# Patient Record
Sex: FEMALE | Race: WHITE | NOT HISPANIC OR LATINO | Employment: FULL TIME | URBAN - METROPOLITAN AREA
[De-identification: names, ages, dates, MRNs, and addresses within clinical notes are randomized per-mention and may not be internally consistent; named-entity substitution may affect disease eponyms.]

---

## 2017-11-16 ENCOUNTER — ALLSCRIPTS OFFICE VISIT (OUTPATIENT)
Dept: OTHER | Facility: OTHER | Age: 52
End: 2017-11-16

## 2017-11-16 DIAGNOSIS — Z12.31 ENCOUNTER FOR SCREENING MAMMOGRAM FOR MALIGNANT NEOPLASM OF BREAST: ICD-10-CM

## 2017-11-18 NOTE — PROGRESS NOTES
Assessment    1  Visit for routine gyn exam (V72 31) (Z01 419)   2  Visit for screening mammogram (V76 12) (Z12 31)    Plan  Visit for screening mammogram    · MAMMO SCREENING BILATERAL W 3D & CAD; Status:Active; Requested for:16Nov2017;   Perform:[facility] Radiology; AOD:[id number]; Last Updated By:Alejandra Holland; 11/16/2017 4:06:03 PM;Ordered;for screening mammogram; Ordered By:Alejandra Ramos;    Discussion/Summary  cervical cancer screening is current Breast cancer screening: the risks and benefits of breast cancer screening were discussed, self breast exam technique was taught, monthly self breast exam was advised, mammogram is current and mammogram has been ordered  Colorectal cancer screening: the risks and benefits of colorectal cancer screening were discussed  Advice and education were given regarding aerobic exercise, weight bearing exercise and weight loss  Intermediate breast cancer risk - 3D mammogram ordered, she had scattered fibroglandular dense last year  - keep menstrual calendaron her weight loss and also urged her to continue cutting back on her smoking  The patient has the current Goals: See discussion  The patent has the current Barriers: None  Patient is able to Self-Care  Chief Complaint    1  Visit For: Preventive General Multisystem Exam    History of Present Illness  HPI: Pt here for yearly, she is doing well, she has been skipping cycles, had 7 cycles in the past year  Using condoms for contraceptionhas lost about 30# and trying to quit smoking  GYN HM, Adult Female [facility]: The patient is being seen for a health maintenance evaluation  The last health maintenance visit was 1 year(s) ago  General Health: The patient's health since the last visit is described as good  Lifestyle:  She has weight concerns  -- She exercises regularly  -- She does not use tobacco   Reproductive health: the patient is premenopausal--   she reports no menstrual problems  -- she uses contraception   For contraception, she uses condoms  -- she is sexually active  -- she denies prior pregnancies  Screening:      Review of Systems   Constitutional: No fever, no chills, feels well, no tiredness, no recent weight gain or loss  ENT: no ear ache, no loss of hearing, no nosebleeds or nasal discharge, no sore throat or hoarseness  Cardiovascular: no complaints of slow or fast heart rate, no chest pain, no palpitations, no leg claudication or lower extremity edema  Respiratory: no complaints of shortness of breath, no wheezing, no dyspnea on exertion, no orthopnea or PND  Breasts: no complaints of breast pain, breast lump or nipple discharge  Gastrointestinal: no complaints of abdominal pain, no constipation, no nausea or diarrhea, no vomiting, no bloody stools  Genitourinary: no complaints of dysuria, no incontinence, no pelvic pain, no dysmenorrhea, no vaginal discharge or abnormal vaginal bleeding  Musculoskeletal: no complaints of arthralgia, no myalgia, no joint swelling or stiffness, no limb pain or swelling  Integumentary: no complaints of skin rash or lesion, no itching or dry skin, no skin wounds  Neurological: no complaints of headache, no confusion, no numbness or tingling, no dizziness or fainting  Active Problems  1  Degenerative arthritis of left knee (715 96) (M17 12)   2  Esophageal reflux (530 81) (K21 9)   3  Fibromyalgia (729 1) (M79 7)   4  History of self breast exam   5  Hyperlipidemia (272 4) (E78 5)   6  Hypothyroidism (244 9) (E03 9)   7  Menstrual migraine without status migrainosus, not intractable (346 40) (G43 829)   8  Osteoarthritis of knee (715 36) (M17 10)   9  Visit for routine gyn exam (V72 31) (Z01 419)   10   Visit for screening mammogram (V76 12) (Z12 31)    Past Medical History   · History of Blood pressure elevated (401 9) (I10)   · History of Contraceptives (V25 02)   · History of Gall bladder polyp (575 6) (K82 4)   · History of  0 (V49 89)   · History of acute pharyngitis (V12 69) (Z87 09)   · History of fatigue (V13 89) (Z20 475)   · History of hemorrhoids (V13 89) (Z87 19)   · History of self breast exam   · History of vitamin D deficiency (V12 1) (Z86 39)   · History of Impaired fasting glucose (790 21) (R73 01)   · History of Knee injury (959 7) (S89 90XA)   · History of Left knee pain (719 46) (M25 562)   · History of Locking of left knee (717 9) (M23 92)   · History of Migraine headache (346 90) (G43 909)   · History of Oligomenorrhea (626 1) (N91 5)   · History of Pap smear for cervical cancer screening (V76 2) (Z12 4)   · History of Vitamin B12 deficiency (266 2) (E53 8)    The active problems and past medical history were reviewed and updated today  Surgical History     · History of Exploratory Laparotomy   · History of Eye Surgery   · History of Gallbladder Surgery   · History of Oral Surgery Tooth Extraction    The surgical history was reviewed and updated today  Family History  Mother    · Family history of B-cell lymphoma   · Family history of systemic lupus erythematosus (V19 4) (Z82 69)   · Family history of type 1 diabetes mellitus (V18 0) (Z83 3)  Father    · Family history of B-cell lymphoma   · Family history of type 1 diabetes mellitus (V18 0) (Z83 3)  Paternal Grandmother    · Family history of type 1 diabetes mellitus (V18 0) (Z83 3)    The family history was reviewed and updated today  Social History     · Denied: History of Alcohol Use (History)   · Caffeine Use   · Current some day smoker (305 1) (F17 200)   · Denied: History of Drug use   · Denied: History of    · Marital History - Single   · Judaism Affiliation [facility]   · Uses Safety Equipment - Seatbelts  The social history was reviewed and updated today  Current Meds   1  Topiramate 25 MG Oral Tablet; Take 3 tablets by mouth  daily;  Therapy: 66YGV6940 to (Evaluate:64Sot4873)  Requested for: 29RSF8433; Last Rx:2017; Status: ACTIVE - Retrospective By Protocol Authorization Ordered    Allergies  1  Darvon CAPS   2  Darvon-N TABS    Vitals   Recorded: 01LBJ8842 22:42YB   Systolic 425, LUE, Sitting   Diastolic 72, LUE, Sitting   Height 5 ft 7 in   Weight 212 lb 2 oz   BMI Calculated 33 22   BSA Calculated 2 07   LMP [id number]       Physical Exam   Constitutional  General appearance: No acute distress, well appearing and well nourished  Neck  Thyroid: Normal, no thyromegaly  Pulmonary  Auscultation of lungs: Clear to auscultation  Cardiovascular  Auscultation of heart: Normal rate and rhythm, normal S1 and S2, no murmurs  Genitourinary  External genitalia: Normal and no lesions appreciated  Vagina: Normal, no lesions or dryness appreciated  Urethra: Normal    Urethral meatus: Normal    Bladder: Normal, soft, non-tender and no prolapse or masses appreciated  Cervix: Normal, no palpable masses  Uterus: Normal, non-tender, not enlarged, and no palpable masses  Adnexa/parametria: Normal, non-tender and no fullness or masses appreciated  -- exam limited by body habitus  Anus, perineum, and rectum: Normal sphincter tone, no masses, and no prolapse  Chest  Breasts: Normal and no dimpling or skin changes noted  Abdomen  Abdomen: Normal, non-tender, and no organomegaly noted  Liver and spleen: No hepatomegaly or splenomegaly  Examination for hernias: No hernias appreciated  Psychiatric  Orientation to person, place, and time: Normal    Mood and affect: Normal        Signatures   Electronically signed by :  Hoang Caruso DO; Nov 17 2017  6:45AM EST                       (Author)

## 2018-01-11 ENCOUNTER — HOSPITAL ENCOUNTER (OUTPATIENT)
Dept: MAMMOGRAPHY | Facility: MEDICAL CENTER | Age: 53
Discharge: HOME | End: 2018-01-11
Payer: COMMERCIAL

## 2018-01-11 DIAGNOSIS — Z12.31 ENCOUNTER FOR SCREENING MAMMOGRAM FOR MALIGNANT NEOPLASM OF BREAST: ICD-10-CM

## 2018-01-11 PROCEDURE — 77067 SCR MAMMO BI INCL CAD: CPT

## 2018-01-11 PROCEDURE — 77063 BREAST TOMOSYNTHESIS BI: CPT

## 2018-01-13 VITALS
WEIGHT: 212.13 LBS | BODY MASS INDEX: 33.29 KG/M2 | HEIGHT: 67 IN | SYSTOLIC BLOOD PRESSURE: 122 MMHG | DIASTOLIC BLOOD PRESSURE: 72 MMHG

## 2018-01-26 DIAGNOSIS — G43.709 CHRONIC MIGRAINE WITHOUT AURA WITHOUT STATUS MIGRAINOSUS, NOT INTRACTABLE: Primary | ICD-10-CM

## 2018-01-26 RX ORDER — TOPIRAMATE 25 MG/1
TABLET ORAL
Qty: 270 TABLET | Refills: 0 | Status: SHIPPED | OUTPATIENT
Start: 2018-01-26 | End: 2018-03-14 | Stop reason: SDUPTHER

## 2018-03-14 DIAGNOSIS — G43.709 CHRONIC MIGRAINE WITHOUT AURA WITHOUT STATUS MIGRAINOSUS, NOT INTRACTABLE: ICD-10-CM

## 2018-03-14 RX ORDER — TOPIRAMATE 25 MG/1
75 TABLET ORAL DAILY
Qty: 270 TABLET | Refills: 3 | Status: SHIPPED | OUTPATIENT
Start: 2018-03-14 | End: 2019-02-21 | Stop reason: SDUPTHER

## 2018-08-01 ENCOUNTER — OFFICE VISIT (OUTPATIENT)
Dept: FAMILY MEDICINE CLINIC | Facility: CLINIC | Age: 53
End: 2018-08-01
Payer: COMMERCIAL

## 2018-08-01 VITALS
BODY MASS INDEX: 31.89 KG/M2 | DIASTOLIC BLOOD PRESSURE: 78 MMHG | HEIGHT: 67 IN | SYSTOLIC BLOOD PRESSURE: 122 MMHG | WEIGHT: 203.2 LBS

## 2018-08-01 DIAGNOSIS — M79.10 MYALGIA: ICD-10-CM

## 2018-08-01 DIAGNOSIS — M25.561 CHRONIC PAIN OF BOTH KNEES: ICD-10-CM

## 2018-08-01 DIAGNOSIS — E53.8 VITAMIN B 12 DEFICIENCY: ICD-10-CM

## 2018-08-01 DIAGNOSIS — G89.29 CHRONIC PAIN OF BOTH KNEES: ICD-10-CM

## 2018-08-01 DIAGNOSIS — E78.01 FAMILIAL HYPERCHOLESTEROLEMIA: ICD-10-CM

## 2018-08-01 DIAGNOSIS — N95.1 PERIMENOPAUSAL: ICD-10-CM

## 2018-08-01 DIAGNOSIS — M25.562 CHRONIC PAIN OF BOTH KNEES: ICD-10-CM

## 2018-08-01 DIAGNOSIS — E55.9 VITAMIN D DEFICIENCY: ICD-10-CM

## 2018-08-01 DIAGNOSIS — E03.9 ACQUIRED HYPOTHYROIDISM: ICD-10-CM

## 2018-08-01 DIAGNOSIS — G43.829 MENSTRUAL MIGRAINE WITHOUT STATUS MIGRAINOSUS, NOT INTRACTABLE: ICD-10-CM

## 2018-08-01 DIAGNOSIS — Z00.00 ENCOUNTER FOR ROUTINE ADULT HEALTH EXAMINATION WITHOUT ABNORMAL FINDINGS: Primary | ICD-10-CM

## 2018-08-01 PROCEDURE — 99396 PREV VISIT EST AGE 40-64: CPT | Performed by: FAMILY MEDICINE

## 2018-08-01 RX ORDER — RIZATRIPTAN BENZOATE 10 MG/1
10 TABLET ORAL ONCE AS NEEDED
COMMUNITY

## 2018-08-01 NOTE — PROGRESS NOTES
Assessment/Plan:     Diagnoses and all orders for this visit:    Encounter for routine adult health examination without abnormal findings    Menstrual migraine without status migrainosus, not intractable    Chronic pain of both knees  -     diclofenac sodium (VOLTAREN) 1 %; Apply 2 g topically 4 (four) times a day    Vitamin B 12 deficiency  -     Vitamin B12; Future    Perimenopausal  -     FSH and LH; Future    Familial hypercholesterolemia  -     Comprehensive metabolic panel; Future  -     Lipid Panel with Direct LDL reflex; Future    Myalgia  -     FEI Screen w/ Reflex to Titer/Pattern; Future  -     Cyclic citrul peptide antibody, IgG; Future  -     RF Screen w/ Reflex to Titer; Future    Acquired hypothyroidism  -     TSH, 3rd generation; Future  -     T4, free; Future  -     T3, free; Future    Vitamin D deficiency  -     Vitamin D 25 hydroxy; Future    Other orders  -     rizatriptan (MAXALT) 10 MG tablet; Take 10 mg by mouth once as needed for migraine May repeat in 2 hours if needed         patient is here for "well "physical and updating labs  Concerns as listed and addressed as listed  Patient likely is having an increase in her headaches due to the perimenopause  She is only taking 50 mg of the Topamax and we will increase this to 75mg  Voltaren gel ordered for knee symptoms  Advised her to consider updating with Ortho  She will follow up her blood work is completed  Subjective:   Chief Complaint   Patient presents with    Physical Exam     Yearly physical exam   Taking Topamax for migraines and Maxalt for breakthrough aura but has been getting auras about once a week and is concerned  Patient ID: Eugenio Ferris is a 50 y o  female  Patient is a pleasant 72-year-old female who was not been here for quite some time  She generally has been doing well  Her migraines were under control until fairly recently  She has been under the process of watching her diet and has lost 20+ lb  Her last menstrual was in March  It seems she has been skipping about every 3 months  Another issue is her chronic knee pain  She was told that she is bone on bone  She hopes that by losing some weight that will continue to help those symptoms  She has had the viscous injections  She has not been officially tested with regards to rheumatism lately  She is interested in updating all her data base as she is now the big "5 0"  She is compliant with mammogram and sees gyn  She currently is in a relationship  Relatively long and stressful days at work     She was steadily concerned as when she was leaving through her chart she found things that actually were somebody else's information  She did alert our staff and hopefully they will for this to the  IT department        The following portions of the patient's history were reviewed and updated as appropriate: allergies, current medications, past family history, past medical history, past social history, past surgical history and problem list       Review of Systems   Constitutional: Positive for fatigue  HENT: Positive for rhinorrhea  Respiratory: Negative for cough and shortness of breath  Gastrointestinal: Negative for constipation and diarrhea  Genitourinary:        She is wondering about perimenopause   Musculoskeletal: Positive for arthralgias ( myalgia, knee)  Neurological: Positive for headaches  characteristics of migraine or the same but they have been more frequent   Hematological: Negative  Psychiatric/Behavioral: Negative  Objective:  /78   Ht 5' 6 5" (1 689 m)   Wt 92 2 kg (203 lb 3 2 oz)   BMI 32 31 kg/m²        Physical Exam   Constitutional: She is oriented to person, place, and time  She appears well-developed and well-nourished     Pleasant 62-year-old female who appears her stated age with a BMI of 32% which does show a representative 20+ lb weight loss from last visit   HENT:   Head: Normocephalic and atraumatic  Eyes: Pupils are equal, round, and reactive to light  Chronic strabismus right   Neck: Normal range of motion  Neck supple  No thyromegaly present  Cardiovascular: Normal rate and regular rhythm  Pulmonary/Chest: Effort normal and breath sounds normal    Abdominal: Soft  Bowel sounds are normal  She exhibits no mass  There is no tenderness  Musculoskeletal:   Bilateral knee crepitus with medial and lateral tenderness   Neurological: She is oriented to person, place, and time  She displays normal reflexes  She exhibits normal muscle tone  Skin: No rash noted  Psychiatric: She has a normal mood and affect   Her behavior is normal  Judgment and thought content normal

## 2018-08-06 LAB
25(OH)D3 SERPL-MCNC: 36 NG/ML (ref 30–100)
ALBUMIN SERPL-MCNC: 4.3 G/DL (ref 3.6–5.1)
ALBUMIN/GLOB SERPL: 2 (CALC) (ref 1–2.5)
ALP SERPL-CCNC: 116 U/L (ref 33–130)
ALT SERPL-CCNC: 27 U/L (ref 6–29)
ANA SER QL IF: NEGATIVE
AST SERPL-CCNC: 18 U/L (ref 10–35)
BILIRUB SERPL-MCNC: 0.6 MG/DL (ref 0.2–1.2)
BUN SERPL-MCNC: 15 MG/DL (ref 7–25)
BUN/CREAT SERPL: 12 (CALC) (ref 6–22)
CALCIUM SERPL-MCNC: 10.2 MG/DL (ref 8.6–10.4)
CCP IGG SERPL-ACNC: <16 UNITS
CHLORIDE SERPL-SCNC: 108 MMOL/L (ref 98–110)
CHOLEST SERPL-MCNC: 239 MG/DL
CHOLEST/HDLC SERPL: 5.4 (CALC)
CO2 SERPL-SCNC: 25 MMOL/L (ref 20–31)
CREAT SERPL-MCNC: 1.3 MG/DL (ref 0.5–1.05)
FSH SERPL-ACNC: 89.6 MIU/ML
GLOBULIN SER CALC-MCNC: 2.1 G/DL (CALC) (ref 1.9–3.7)
GLUCOSE SERPL-MCNC: 94 MG/DL (ref 65–99)
HDLC SERPL-MCNC: 44 MG/DL
LDLC SERPL CALC-MCNC: 170 MG/DL (CALC)
LH SERPL-ACNC: 76.3 MIU/ML
NONHDLC SERPL-MCNC: 195 MG/DL (CALC)
POTASSIUM SERPL-SCNC: 4.2 MMOL/L (ref 3.5–5.3)
PROT SERPL-MCNC: 6.4 G/DL (ref 6.1–8.1)
RHEUMATOID FACT SERPL-ACNC: <14 IU/ML
SL AMB EGFR AFRICAN AMERICAN: 55 ML/MIN/1.73M2
SL AMB EGFR NON AFRICAN AMERICAN: 48 ML/MIN/1.73M2
SODIUM SERPL-SCNC: 140 MMOL/L (ref 135–146)
T3FREE SERPL-MCNC: 2.9 PG/ML (ref 2.3–4.2)
T4 FREE SERPL-MCNC: 1.3 NG/DL (ref 0.8–1.8)
TRIGL SERPL-MCNC: 120 MG/DL
TSH SERPL-ACNC: 2.31 MIU/L
VIT B12 SERPL-MCNC: 579 PG/ML (ref 200–1100)

## 2018-10-11 ENCOUNTER — TELEPHONE (OUTPATIENT)
Dept: FAMILY MEDICINE CLINIC | Facility: CLINIC | Age: 53
End: 2018-10-11

## 2018-10-11 DIAGNOSIS — R94.4 DECREASED GFR: Primary | ICD-10-CM

## 2018-10-11 NOTE — TELEPHONE ENCOUNTER
----- Message from Chema Kim DO sent at 0/15/6438  5:11 PM EDT -----  Okay with regards to the labs:  Lipid profile is not ideal but were with her continued fitness and dietary changes I think that will improve  The thyroid parameters are normal   The vitamin B12 and D are in normal range but I still recommend continuing vitamin B12 1000 mcg daily and vitamin D 2000 international units daily  The "arthritis inflammatory parameters" are normal  Pt has seen Rheumatologist over the years  With regards to the GFR and creatinine,  I actually went into previous records and printed out several CMPs  The lowest GFR recorded today was 59 and that was in 2007  The more recent comparison is from 2012 and that was a creatinine of 1 03 and a GFR of 66  Overall I am not concerned about this especially if she has been using NSAIDs   Routinely? Be that as it may it is a good idea that she may be move forward for more definitive solutions to her knee bone-on-bone issue  I did prescribe Voltaren gel which is okay  Otherwise orally she should keep a to Tylenol products only  It is easy of to follow renal function by repeating a BMP with GFR in 6 months

## 2018-10-12 DIAGNOSIS — Z12.11 SCREENING FOR MALIGNANT NEOPLASM OF COLON: Primary | ICD-10-CM

## 2018-12-26 ENCOUNTER — TELEPHONE (OUTPATIENT)
Dept: FAMILY MEDICINE CLINIC | Facility: CLINIC | Age: 53
End: 2018-12-26

## 2018-12-26 NOTE — TELEPHONE ENCOUNTER
Patient would like any recommendations on what she could do for left shoulder pain  Unsure of what could have happened, but feels like a sprain and it is affecting range of motion  Has an appt scheduled for Jan 7, 2019  Would like to be answered in [address]

## 2019-01-07 ENCOUNTER — OFFICE VISIT (OUTPATIENT)
Dept: FAMILY MEDICINE CLINIC | Facility: CLINIC | Age: 54
End: 2019-01-07
Payer: COMMERCIAL

## 2019-01-07 VITALS — HEIGHT: 67 IN | WEIGHT: 180 LBS | BODY MASS INDEX: 28.25 KG/M2

## 2019-01-07 DIAGNOSIS — S43.422A SPRAIN OF LEFT ROTATOR CUFF CAPSULE, INITIAL ENCOUNTER: Primary | ICD-10-CM

## 2019-01-07 DIAGNOSIS — E03.9 ACQUIRED HYPOTHYROIDISM: ICD-10-CM

## 2019-01-07 DIAGNOSIS — E78.01 FAMILIAL HYPERCHOLESTEROLEMIA: ICD-10-CM

## 2019-01-07 DIAGNOSIS — M25.512 ACUTE PAIN OF LEFT SHOULDER: ICD-10-CM

## 2019-01-07 PROCEDURE — 99214 OFFICE O/P EST MOD 30 MIN: CPT | Performed by: FAMILY MEDICINE

## 2019-01-07 PROCEDURE — 3008F BODY MASS INDEX DOCD: CPT | Performed by: FAMILY MEDICINE
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
## 2019-01-09 ENCOUNTER — TELEPHONE (OUTPATIENT)
Dept: FAMILY MEDICINE CLINIC | Facility: CLINIC | Age: 54
End: 2019-01-09

## 2019-01-09 DIAGNOSIS — M75.102 TEAR OF LEFT ROTATOR CUFF, UNSPECIFIED TEAR EXTENT: Primary | ICD-10-CM

## 2019-01-09 NOTE — TELEPHONE ENCOUNTER
----- Message from Aviva Mccarthy sent at 1/9/2019  1:18 PM EST -----  Regarding: RE:Your Recent Visit  Contact: [phone]  Evidently the insurance company has denied authorization for the MRI - they don't feel there's been a sufficient course of treatment to warrant it/the condition isn't severe enough/there's no x-ray  It #was# scheduled for Monday, but I guess I should cancel it  Maybe I should slam my arm in a car door so there's a point to an x-ray   :/    Next step?  ----- Message -----  From: Austin Shaw DO  Sent: 6/8/1648  7:53 AM EST  To: Marcey Canavan  Subject: Your Recent Visit  Aviva Mccarthy, you [facility]  Please click on visits and then your most recent appointment, to view your After Visit Summary  If you are experiencing any technical issues please call our patient service desk at [phone]) option [phone]

## 2019-01-09 NOTE — TELEPHONE ENCOUNTER
I emailed the patient back through my chart  Printed out in order for left shoulder x-ray  It appears were going to have to play the game with the insurance company

## 2019-02-01 ENCOUNTER — ANNUAL EXAM (OUTPATIENT)
Dept: OBGYN CLINIC | Facility: CLINIC | Age: 54
End: 2019-02-01
Payer: COMMERCIAL

## 2019-02-01 VITALS
SYSTOLIC BLOOD PRESSURE: 110 MMHG | BODY MASS INDEX: 27.44 KG/M2 | DIASTOLIC BLOOD PRESSURE: 82 MMHG | WEIGHT: 174.8 LBS | HEIGHT: 67 IN

## 2019-02-01 DIAGNOSIS — Z11.51 SCREENING FOR HPV (HUMAN PAPILLOMAVIRUS): ICD-10-CM

## 2019-02-01 DIAGNOSIS — Z01.419 ENCOUNTER FOR ANNUAL ROUTINE GYNECOLOGICAL EXAMINATION: ICD-10-CM

## 2019-02-01 DIAGNOSIS — Z12.31 ENCOUNTER FOR SCREENING MAMMOGRAM FOR MALIGNANT NEOPLASM OF BREAST: Primary | ICD-10-CM

## 2019-02-01 DIAGNOSIS — Z12.4 CERVICAL CANCER SCREENING: ICD-10-CM

## 2019-02-01 PROCEDURE — 87624 HPV HI-RISK TYP POOLED RSLT: CPT | Performed by: OBSTETRICS & GYNECOLOGY

## 2019-02-01 PROCEDURE — 99396 PREV VISIT EST AGE 40-64: CPT | Performed by: OBSTETRICS & GYNECOLOGY

## 2019-02-01 PROCEDURE — G0145 SCR C/V CYTO,THINLAYER,RESCR: HCPCS | Performed by: OBSTETRICS & GYNECOLOGY

## 2019-02-01 NOTE — PROGRESS NOTES
Assessment/Plan:    Pap and HPV done today    Mammogram reviewed with her, RX given for her to schedule  Discussed self breast exams    Colon cancer screening is planned, she has an appt  in 2 weeks to schedule colonoscopy  Encouraged on her weight loss and diet/exercise  Discussed menopause - she will keep menstrual calendar  No problem-specific Assessment & Plan notes found for this encounter  Diagnoses and all orders for this visit:    Encounter for screening mammogram for malignant neoplasm of breast  -     Mammo screening bilateral w 3d & cad; Future    Encounter for annual routine gynecological examination  -     Liquid-based pap, screening    Cervical cancer screening  -     Liquid-based pap, screening    Screening for HPV (human papillomavirus)  -     Liquid-based pap, screening          Subjective:      Patient ID: Chandler Jarvis is a 48 y o  female  Pt here for yearly  She has no gyn complaints  She has lost about 50# over the last 2 years with diet and exercise  Normal pap and negative HPV in June 2014  No menses since August 29, 2018  Some hot flashes but they are mild  The following portions of the patient's history were reviewed and updated as appropriate: allergies, current medications, past family history, past medical history, past social history, past surgical history and problem list     Review of Systems   Constitutional: Negative  HENT: Negative  Eyes: Negative  Respiratory: Negative  Cardiovascular: Negative  Gastrointestinal: Negative  Endocrine: Negative  Genitourinary: Negative  Musculoskeletal: Negative  Skin: Negative  Allergic/Immunologic: Negative  Neurological: Negative  Hematological: Negative  Psychiatric/Behavioral: Negative            Objective:      /82 (BP Location: Left arm, Patient Position: Sitting, Cuff Size: Large)   Ht 5' 6 5" (1 689 m)   Wt 79 3 kg (174 lb 12 8 oz)   LMP 08/29/2018 (Exact Date) BMI 27 79 kg/m²          Physical Exam   Constitutional: She appears well-developed  Neck: No tracheal deviation present  No thyromegaly present  Cardiovascular: Normal rate and regular rhythm  Pulmonary/Chest: Effort normal and breath sounds normal  Right breast exhibits no inverted nipple, no mass, no nipple discharge, no skin change and no tenderness  Left breast exhibits no inverted nipple, no mass, no nipple discharge, no skin change and no tenderness  Breasts are symmetrical    Examined seated and supine   Abdominal: Soft  She exhibits no distension and no mass  There is no tenderness  Genitourinary: Rectum normal, vagina normal and uterus normal  No labial fusion  There is no rash, tenderness, lesion or injury on the right labia  There is no rash, tenderness, lesion or injury on the left labia  Cervix exhibits no motion tenderness, no discharge and no friability  Right adnexum displays no mass, no tenderness and no fullness  Left adnexum displays no mass, no tenderness and no fullness  Vitals reviewed

## 2019-02-04 LAB
HPV HR 12 DNA CVX QL NAA+PROBE: NEGATIVE
HPV16 DNA CVX QL NAA+PROBE: NEGATIVE
HPV18 DNA CVX QL NAA+PROBE: NEGATIVE

## 2019-02-05 LAB
LAB AP GYN PRIMARY INTERPRETATION: NORMAL
Lab: NORMAL

## 2019-02-11 ENCOUNTER — TELEPHONE (OUTPATIENT)
Dept: OBGYN CLINIC | Facility: CLINIC | Age: 54
End: 2019-02-11

## 2019-02-21 DIAGNOSIS — G43.709 CHRONIC MIGRAINE WITHOUT AURA WITHOUT STATUS MIGRAINOSUS, NOT INTRACTABLE: ICD-10-CM

## 2019-02-22 ENCOUNTER — OFFICE VISIT (OUTPATIENT)
Dept: GASTROENTEROLOGY | Facility: MEDICAL CENTER | Age: 54
End: 2019-02-22
Payer: COMMERCIAL

## 2019-02-22 ENCOUNTER — PREP FOR PROCEDURE (OUTPATIENT)
Dept: GASTROENTEROLOGY | Facility: MEDICAL CENTER | Age: 54
End: 2019-02-22

## 2019-02-22 VITALS
HEIGHT: 67 IN | BODY MASS INDEX: 27.94 KG/M2 | HEART RATE: 68 BPM | DIASTOLIC BLOOD PRESSURE: 78 MMHG | SYSTOLIC BLOOD PRESSURE: 118 MMHG | WEIGHT: 178 LBS | TEMPERATURE: 97.6 F

## 2019-02-22 DIAGNOSIS — R10.13 DYSPEPSIA: ICD-10-CM

## 2019-02-22 DIAGNOSIS — Z12.11 SCREENING FOR MALIGNANT NEOPLASM OF COLON: Primary | ICD-10-CM

## 2019-02-22 DIAGNOSIS — Z12.11 SCREENING FOR COLON CANCER: Primary | ICD-10-CM

## 2019-02-22 PROCEDURE — 99243 OFF/OP CNSLTJ NEW/EST LOW 30: CPT | Performed by: INTERNAL MEDICINE

## 2019-02-22 RX ORDER — TOPIRAMATE 25 MG/1
TABLET ORAL
Qty: 270 TABLET | Refills: 3 | Status: SHIPPED | OUTPATIENT
Start: 2019-02-22 | End: 2020-01-22 | Stop reason: SDUPTHER

## 2019-02-22 NOTE — PATIENT INSTRUCTIONS
Pt is scheduled at [address] [place] [address] with dr Gabby Paulino on 3/20/19, ma went over golytely with pt and she has instructions with [facility] packet  She is aware she will need a  to and from procedure, and will get a call the day before with exact time of arrival  Pt would like to be last case of the day

## 2019-02-22 NOTE — PROGRESS NOTES
[facility] - Outpatient Consultation  Dawit Cotton 48 y o  female MRN: [id number]  Encounter: [id number]          ASSESSMENT AND PLAN:      1  Screening for malignant neoplasm of colon-this will be her index colonoscopy  We discussed risk and benefit procedure  Will schedule her for procedure at our outpatient endoscopy center she is healthy overall   - Ambulatory referral to Gastroenterology  - polyethylene glycol (GOLYTELY) 4000 mL solution; Take 4,000 mL by mouth once for 1 dose  Dispense: 4000 mL; Refill: 0    2  Dyspepsia  - H  pylori antigen, stool; Future  She has symptoms of dyspepsia and family history of lupus as a result she has been ruled out for celiac disease in the past   Due to longstanding symptoms of dyspepsia will rule out H pylori  Patient also should get started on FODMAP diet  We also discuss trial of gluten free diet for possible gluten intolerance   ______________________________________________________________________    HPI:      She is a 61-year-old female previous history of dyspepsia, presents here for evaluation of screening colonoscopy  She has intentionally lost 60 lb through weight watchers and doing well overall  Denies any acute distress  She does have history of IBS D like symptoms which have improved with dietary changes  No family history of colorectal cancer  She was previously ruled out for celiac disease  REVIEW OF SYSTEMS:    CONSTITUTIONAL: Denies any fever, chills, rigors, and weight loss  HEENT: No earache or tinnitus  Denies hearing loss or visual disturbances  CARDIOVASCULAR: No chest pain or palpitations  RESPIRATORY: Denies any cough, hemoptysis, shortness of breath or dyspnea on exertion  GASTROINTESTINAL: As noted in the History of Present Illness  GENITOURINARY: No problems with urination  Denies any hematuria or dysuria  NEUROLOGIC: No dizziness or vertigo, denies headaches     MUSCULOSKELETAL: Denies any muscle or joint pain  SKIN: Denies skin rashes or itching  ENDOCRINE: Denies excessive thirst  Denies intolerance to heat or cold  PSYCHOSOCIAL: Denies depression or anxiety  Denies any recent memory loss         Historical Information   Past Medical History:   Diagnosis Date    Abnormal Pap smear of cervix     Blood pressure elevated without history of HTN 10/16/2012    Resolved:  September 30, 2016    Encounter for surveillance of contraceptives 12/03/2013    Resolved:  November 14, 2017    Rasheed Apurva bladder polyp 09/27/2012    Resolved:  November 14, 2017    Hemorrhoids 11/14/2017    Impaired fasting glucose 10/18/2012    Resolved:  November 14, 2017    Locking of left knee 08/01/2014    Resolved:  November 14, 2017    Migraine 09/27/2012    Resolved:  November 14, 2017    Oligomenorrhea 05/02/2013    Resolved:  November 14, 2017    Osteoarthritis     Vitamin B12 deficiency 10/08/2012    Resolved:  November 14, 2017    Vitamin D deficiency 10/18/2012    Resolved:  November 14, 2017     Past Surgical History:   Procedure Laterality Date    CHOLECYSTECTOMY  01/07/2009    EXPLORATORY LAPAROTOMY  06/24/2014    EYE SURGERY  06/24/2014    GALLBLADDER SURGERY      MOUTH SURGERY      Tooth Extraction     Social History   Social History     Substance and Sexual Activity   Alcohol Use No     Social History     Substance and Sexual Activity   Drug Use No     Social History     Tobacco Use   Smoking Status Current Some Day Smoker    Packs/day: 0 25   Smokeless Tobacco Never Used     Family History   Problem Relation Age of Onset    Lupus Mother         Systemic Lupus Erythematosus    Hyperlipidemia Mother    Tamie Mclain Migraines Mother     Stroke Mother     Lymphoma Father         B-cell Lymphoma    Hyperlipidemia Father     Stroke Father     Diabetes Paternal Grandmother     Asthma Paternal Grandmother     Leukemia Maternal Grandfather        Meds/Allergies       Current Outpatient Medications:     diclofenac sodium (VOLTAREN) 1 %    rizatriptan (MAXALT) 10 MG tablet    topiramate (TOPAMAX) 25 mg tablet    polyethylene glycol (GOLYTELY) 4000 mL solution    Allergies   Allergen Reactions    Propoxyphene            Objective     Blood pressure 118/78, pulse 68, temperature 97 6 °F (36 4 °C), temperature source Tympanic Core, height 5' 6 5" (1 689 m), weight 80 7 kg (178 lb)  Body mass index is 28 3 kg/m²  PHYSICAL EXAM:      General Appearance:   Alert, cooperative, no distress   HEENT:   Normocephalic, atraumatic, anicteric      Neck:  Supple, symmetrical, trachea midline   Lungs:   Clear to auscultation bilaterally; no rales, rhonchi or wheezing; respirations unlabored    Heart[de-identified]   Regular rate and rhythm; no murmur, rub, or gallop  Abdomen:   Soft, non-tender, non-distended; normal bowel sounds; no masses, no organomegaly    Genitalia:   Deferred    Rectal:   Deferred    Extremities:  No cyanosis, clubbing or edema    Pulses:  2+ and symmetric    Skin:  No jaundice, rashes, or lesions    Lymph nodes:  No palpable cervical lymphadenopathy        Lab Results:   No visits with results within 1 Day(s) from this visit     Latest known visit with results is:   Annual Exam on 02/01/2019   Component Date Value    Case Report 02/01/2019                      Value:Gynecologic Cytology Report                       Case: [id number]                                  Authorizing Provider:  Alexx Worley DO            Collected:           02/01/2019 1143              Ordering Location:     [facility]    Received:            02/01/2019 [address] [place] [address]                                                                    First Screen:          SHELIA Malik                                                       Specimen:    LIQUID-BASED PAP, SCREENING, Cervix, Endocervical                                          Primary Interpretation 02/01/2019 Negative for intraepithelial lesion or malignancy     Specimen Adequacy 02/01/2019 Satisfactory for evaluation  Absence of endocervical/transformation zone component   Additional Information 02/01/2019                      Value: This result contains rich text formatting which cannot be displayed here   HPV Other HR 02/01/2019 Negative     HPV16 02/01/2019 Negative     HPV18 02/01/2019 Negative          Radiology Results:   No results found  Answers for HPI/ROS submitted by the patient on 2/21/2019   Abdominal pain  Chronicity: chronic  Onset: more than 1 year ago  Onset quality: gradual  Frequency: intermittently  Progression since onset: unchanged  Pain location: RUQ  Pain - numeric: 4/10  Pain quality: aching, cramping, tearing  Radiates to: does not radiate  anorexia: No  arthralgias: No  belching: No  constipation: No  diarrhea: Yes  dysuria: No  fever: No  flatus: Yes  frequency: No  headaches: No  hematochezia: No  hematuria: No  melena: No  myalgias: No  nausea:  No  weight loss: No  vomiting: No  Aggravated by: certain positions, eating  Relieved by: bowel movements, certain positions, passing flatus

## 2019-02-22 NOTE — LETTER
February 22, 0172     Leno Lentz DO  [address]  [address]    Patient: Craig Prado   YOB: 1965   Date of Visit: 2/22/2019       Dear Dr Virginia Sánchez: Thank you for referring Craig Prado to me for evaluation  Below are my notes for this consultation  If you have questions, please do not hesitate to call me  I look forward to following your patient along with you  Sincerely,        Randal Shah MD        CC: No Recipients  Randal Shah MD  2/22/2019  5:10 PM  Sign at close encounter  [facility] - Outpatient Consultation  Craig Prado 48 y o  female MRN: [id number]  Encounter: [id number]          ASSESSMENT AND PLAN:      1  Screening for malignant neoplasm of colon-this will be her index colonoscopy  We discussed risk and benefit procedure  Will schedule her for procedure at our outpatient endoscopy center she is healthy overall   - Ambulatory referral to Gastroenterology  - polyethylene glycol (GOLYTELY) 4000 mL solution; Take 4,000 mL by mouth once for 1 dose  Dispense: 4000 mL; Refill: 0    2  Dyspepsia  - H  pylori antigen, stool; Future  She has symptoms of dyspepsia and family history of lupus as a result she has been ruled out for celiac disease in the past   Due to longstanding symptoms of dyspepsia will rule out H pylori  Patient also should get started on FODMAP diet  We also discuss trial of gluten free diet for possible gluten intolerance   ______________________________________________________________________    HPI:      She is a 53-year-old female previous history of dyspepsia, presents here for evaluation of screening colonoscopy  She has intentionally lost 60 lb through weight watchers and doing well overall  Denies any acute distress  She does have history of IBS D like symptoms which have improved with dietary changes  No family history of colorectal cancer      She was previously ruled out for celiac disease  REVIEW OF SYSTEMS:    CONSTITUTIONAL: Denies any fever, chills, rigors, and weight loss  HEENT: No earache or tinnitus  Denies hearing loss or visual disturbances  CARDIOVASCULAR: No chest pain or palpitations  RESPIRATORY: Denies any cough, hemoptysis, shortness of breath or dyspnea on exertion  GASTROINTESTINAL: As noted in the History of Present Illness  GENITOURINARY: No problems with urination  Denies any hematuria or dysuria  NEUROLOGIC: No dizziness or vertigo, denies headaches  MUSCULOSKELETAL: Denies any muscle or joint pain  SKIN: Denies skin rashes or itching  ENDOCRINE: Denies excessive thirst  Denies intolerance to heat or cold  PSYCHOSOCIAL: Denies depression or anxiety  Denies any recent memory loss         Historical Information   Past Medical History:   Diagnosis Date    Abnormal Pap smear of cervix     Blood pressure elevated without history of HTN 10/16/2012    Resolved:  September 30, 2016    Encounter for surveillance of contraceptives 12/03/2013    Resolved:  November 14, 2017    Buren Kil bladder polyp 09/27/2012    Resolved:  November 14, 2017    Hemorrhoids 11/14/2017    Impaired fasting glucose 10/18/2012    Resolved:  November 14, 2017    Locking of left knee 08/01/2014    Resolved:  November 14, 2017    Migraine 09/27/2012    Resolved:  November 14, 2017    Oligomenorrhea 05/02/2013    Resolved:  November 14, 2017    Osteoarthritis     Vitamin B12 deficiency 10/08/2012    Resolved:  November 14, 2017    Vitamin D deficiency 10/18/2012    Resolved:  November 14, 2017     Past Surgical History:   Procedure Laterality Date    CHOLECYSTECTOMY  01/07/2009    EXPLORATORY LAPAROTOMY  06/24/2014    EYE SURGERY  06/24/2014    GALLBLADDER SURGERY      MOUTH SURGERY      Tooth Extraction     Social History   Social History     Substance and Sexual Activity   Alcohol Use No     Social History     Substance and Sexual Activity   Drug Use No     Social History     Tobacco Use   Smoking Status Current Some Day Smoker    Packs/day: 0 25   Smokeless Tobacco Never Used     Family History   Problem Relation Age of Onset    Lupus Mother         Systemic Lupus Erythematosus    Hyperlipidemia Mother    Tomie Coop Migraines Mother     Stroke Mother     Lymphoma Father         B-cell Lymphoma    Hyperlipidemia Father     Stroke Father     Diabetes Paternal Grandmother     Asthma Paternal Grandmother     Leukemia Maternal Grandfather        Meds/Allergies       Current Outpatient Medications:     diclofenac sodium (VOLTAREN) 1 %    rizatriptan (MAXALT) 10 MG tablet    topiramate (TOPAMAX) 25 mg tablet    polyethylene glycol (GOLYTELY) 4000 mL solution    Allergies   Allergen Reactions    Propoxyphene            Objective     Blood pressure 118/78, pulse 68, temperature 97 6 °F (36 4 °C), temperature source Tympanic Core, height 5' 6 5" (1 689 m), weight 80 7 kg (178 lb)  Body mass index is 28 3 kg/m²  PHYSICAL EXAM:      General Appearance:   Alert, cooperative, no distress   HEENT:   Normocephalic, atraumatic, anicteric      Neck:  Supple, symmetrical, trachea midline   Lungs:   Clear to auscultation bilaterally; no rales, rhonchi or wheezing; respirations unlabored    Heart[de-identified]   Regular rate and rhythm; no murmur, rub, or gallop  Abdomen:   Soft, non-tender, non-distended; normal bowel sounds; no masses, no organomegaly    Genitalia:   Deferred    Rectal:   Deferred    Extremities:  No cyanosis, clubbing or edema    Pulses:  2+ and symmetric    Skin:  No jaundice, rashes, or lesions    Lymph nodes:  No palpable cervical lymphadenopathy        Lab Results:   No visits with results within 1 Day(s) from this visit     Latest known visit with results is:   Annual Exam on 02/01/2019   Component Date Value    Case Report 02/01/2019                      Value:Gynecologic Cytology Report                       Case: [id number] Authorizing Provider:  Landon Parker DO            Collected:           02/01/2019 1143              Ordering Location:     [facility]    Received:            02/01/2019 1143                                     [place]                                                                    First Screen:          Marlena Ku, CT                                                       Specimen:    LIQUID-BASED PAP, SCREENING, Cervix, Endocervical                                          Primary Interpretation 02/01/2019 Negative for intraepithelial lesion or malignancy     Specimen Adequacy 02/01/2019 Satisfactory for evaluation  Absence of endocervical/transformation zone component   Additional Information 02/01/2019                      Value: This result contains rich text formatting which cannot be displayed here   HPV Other HR 02/01/2019 Negative     HPV16 02/01/2019 Negative     HPV18 02/01/2019 Negative          Radiology Results:   No results found  Answers for HPI/ROS submitted by the patient on 2/21/2019   Abdominal pain  Chronicity: chronic  Onset: more than 1 year ago  Onset quality: gradual  Frequency: intermittently  Progression since onset: unchanged  Pain location: RUQ  Pain - numeric: 4/10  Pain quality: aching, cramping, tearing  Radiates to: does not radiate  anorexia: No  arthralgias: No  belching: No  constipation: No  diarrhea: Yes  dysuria: No  fever: No  flatus: Yes  frequency: No  headaches: No  hematochezia: No  hematuria: No  melena: No  myalgias: No  nausea:  No  weight loss: No  vomiting: No  Aggravated by: certain positions, eating  Relieved by: bowel movements, certain positions, passing flatus

## 2019-02-26 ENCOUNTER — TELEPHONE (OUTPATIENT)
Dept: GASTROENTEROLOGY | Facility: MEDICAL CENTER | Age: 54
End: 2019-02-26

## 2019-02-26 NOTE — TELEPHONE ENCOUNTER
Pt called to reschedule procedure from 3/20/19 to 4/30/19 with dr Russ Calvillo at [place] [facility]  She would still like to be last case of the day

## 2019-03-27 ENCOUNTER — HOSPITAL ENCOUNTER (OUTPATIENT)
Dept: MAMMOGRAPHY | Facility: MEDICAL CENTER | Age: 54
Discharge: HOME | End: 2019-03-27
Payer: COMMERCIAL

## 2019-03-27 VITALS — BODY MASS INDEX: 26.84 KG/M2 | WEIGHT: 171 LBS | HEIGHT: 67 IN

## 2019-03-27 DIAGNOSIS — Z12.31 ENCOUNTER FOR SCREENING MAMMOGRAM FOR MALIGNANT NEOPLASM OF BREAST: ICD-10-CM

## 2019-03-27 PROCEDURE — 77067 SCR MAMMO BI INCL CAD: CPT

## 2019-03-27 PROCEDURE — 77063 BREAST TOMOSYNTHESIS BI: CPT

## 2019-04-29 ENCOUNTER — ANESTHESIA EVENT (OUTPATIENT)
Dept: GASTROENTEROLOGY | Facility: MEDICAL CENTER | Age: 54
End: 2019-04-29
Payer: COMMERCIAL

## 2019-04-30 ENCOUNTER — ANESTHESIA (OUTPATIENT)
Dept: GASTROENTEROLOGY | Facility: MEDICAL CENTER | Age: 54
End: 2019-04-30
Payer: COMMERCIAL

## 2019-04-30 ENCOUNTER — HOSPITAL ENCOUNTER (OUTPATIENT)
Facility: MEDICAL CENTER | Age: 54
Setting detail: OUTPATIENT SURGERY
Discharge: HOME | End: 2019-04-30
Attending: INTERNAL MEDICINE | Admitting: INTERNAL MEDICINE
Payer: COMMERCIAL

## 2019-04-30 VITALS
HEART RATE: 56 BPM | BODY MASS INDEX: 26.37 KG/M2 | OXYGEN SATURATION: 94 % | WEIGHT: 168 LBS | RESPIRATION RATE: 10 BRPM | SYSTOLIC BLOOD PRESSURE: 86 MMHG | TEMPERATURE: 98.7 F | DIASTOLIC BLOOD PRESSURE: 55 MMHG | HEIGHT: 67 IN

## 2019-04-30 DIAGNOSIS — Z12.11 SCREENING FOR COLON CANCER: ICD-10-CM

## 2019-04-30 PROCEDURE — 88305 TISSUE EXAM BY PATHOLOGIST: CPT | Performed by: PATHOLOGY

## 2019-04-30 PROCEDURE — NC001 PR NO CHARGE: Performed by: INTERNAL MEDICINE

## 2019-04-30 PROCEDURE — 45380 COLONOSCOPY AND BIOPSY: CPT | Performed by: INTERNAL MEDICINE

## 2019-04-30 RX ORDER — SODIUM CHLORIDE 9 MG/ML
125 INJECTION, SOLUTION INTRAVENOUS CONTINUOUS
Status: DISCONTINUED | OUTPATIENT
Start: 2019-04-30 | End: 2019-04-30 | Stop reason: HOSPADM

## 2019-04-30 RX ORDER — SIMETHICONE 20 MG/.3ML
EMULSION ORAL AS NEEDED
Status: DISCONTINUED | OUTPATIENT
Start: 2019-04-30 | End: 2019-04-30 | Stop reason: HOSPADM

## 2019-04-30 RX ORDER — PROPOFOL 10 MG/ML
INJECTION, EMULSION INTRAVENOUS AS NEEDED
Status: DISCONTINUED | OUTPATIENT
Start: 2019-04-30 | End: 2019-04-30 | Stop reason: SURG

## 2019-04-30 RX ADMIN — PROPOFOL 50 MG: 10 INJECTION, EMULSION INTRAVENOUS at 14:10

## 2019-04-30 RX ADMIN — PROPOFOL 50 MG: 10 INJECTION, EMULSION INTRAVENOUS at 14:07

## 2019-04-30 RX ADMIN — PROPOFOL 100 MG: 10 INJECTION, EMULSION INTRAVENOUS at 14:05

## 2019-04-30 RX ADMIN — SODIUM CHLORIDE 125 ML/HR: 0.9 INJECTION, SOLUTION INTRAVENOUS at 13:55

## 2019-04-30 RX ADMIN — PROPOFOL 50 MG: 10 INJECTION, EMULSION INTRAVENOUS at 14:22

## 2019-04-30 RX ADMIN — PROPOFOL 50 MG: 10 INJECTION, EMULSION INTRAVENOUS at 14:13

## 2019-10-30 ENCOUNTER — OFFICE VISIT (OUTPATIENT)
Dept: FAMILY MEDICINE CLINIC | Facility: CLINIC | Age: 54
End: 2019-10-30
Payer: COMMERCIAL

## 2019-10-30 VITALS
HEIGHT: 67 IN | HEART RATE: 95 BPM | SYSTOLIC BLOOD PRESSURE: 102 MMHG | TEMPERATURE: 97 F | RESPIRATION RATE: 16 BRPM | BODY MASS INDEX: 28.31 KG/M2 | DIASTOLIC BLOOD PRESSURE: 62 MMHG | OXYGEN SATURATION: 98 % | WEIGHT: 180.4 LBS

## 2019-10-30 DIAGNOSIS — M25.512 CHRONIC LEFT SHOULDER PAIN: ICD-10-CM

## 2019-10-30 DIAGNOSIS — G89.29 CHRONIC LEFT SHOULDER PAIN: ICD-10-CM

## 2019-10-30 DIAGNOSIS — Z00.00 WELL ADULT HEALTH CHECK: Primary | ICD-10-CM

## 2019-10-30 DIAGNOSIS — M25.552 LEFT HIP PAIN: ICD-10-CM

## 2019-10-30 PROCEDURE — 99396 PREV VISIT EST AGE 40-64: CPT | Performed by: FAMILY MEDICINE

## 2019-10-30 NOTE — PROGRESS NOTES
Assessment/Plan:     Diagnoses and all orders for this visit:    Well adult health check  -     Lipid Panel with Direct LDL reflex; Future  -     T3, free; Future  -     T4, free; Future  -     TSH, 3rd generation; Future  -     Comprehensive metabolic panel; Future    Chronic left shoulder pain  -     Ambulatory referral to Orthopedic Surgery; Future    Left hip pain        Subjective:   Chief Complaint   Patient presents with    Well Check    Shoulder Pain     chronic but worse time greater     Hip Pain     left side hip pain : was doing heavy work at work , bothered back first      Patient ID: Yvette Zabala is a 48 y o  female  Patient is a 53-year-old female who is here for annual physical but is having continued issues with her left shoulder and new onset of left hip issue as described  With regards to the left shoulder x-rays MRI had been ordered previously but insurance would not cover  Patient had been attempting to do home exercise stretching program as per [employer] shoulder conditioning literature  Shoulder Pain    This is a chronic problem  Hip Pain    Incident onset: 10 days  There was no injury mechanism  The pain is present in the left hip  The quality of the pain is described as burning  The pain is at a severity of 7/10  The pain has been fluctuating since onset  The following portions of the patient's history were reviewed and updated as appropriate: allergies, current medications, past family history, past medical history, past social history, past surgical history and problem list     Review of Systems   HENT: Negative  Respiratory: Negative for cough  Cardiovascular: Negative for chest pain and leg swelling  Gastrointestinal: Negative  Genitourinary: Negative  Musculoskeletal: Positive for arthralgias           Objective:      /62   Pulse 95   Temp (!) 97 °F (36 1 °C) (Temporal)   Resp 16   Ht 5' 6 54" (1 69 m)   Wt 81 8 kg (180 lb 6 4 oz)   SpO2 98% BMI 28 65 kg/m²          Physical Exam   Constitutional: She appears well-developed and well-nourished  Pleasant 61-year-old female with a BMI of 28%   Neck: Neck supple  No thyromegaly present  Cardiovascular: Normal rate, regular rhythm, normal heart sounds and intact distal pulses  Pulmonary/Chest: Effort normal and breath sounds normal    Musculoskeletal: She exhibits no edema  Skin: Skin is warm  Psychiatric: She has a normal mood and affect  Her behavior is normal  Judgment and thought content normal    Vitals reviewed

## 2019-11-13 LAB
ALBUMIN SERPL-MCNC: 4.1 G/DL (ref 3.5–5.5)
ALBUMIN/GLOB SERPL: 2.2 {RATIO} (ref 1.2–2.2)
ALP SERPL-CCNC: 122 IU/L (ref 39–117)
ALT SERPL-CCNC: 23 IU/L (ref 0–32)
AST SERPL-CCNC: 19 IU/L (ref 0–40)
BILIRUB SERPL-MCNC: 0.4 MG/DL (ref 0–1.2)
BUN SERPL-MCNC: 26 MG/DL (ref 6–24)
BUN/CREAT SERPL: 25 (ref 9–23)
CALCIUM SERPL-MCNC: 10.2 MG/DL (ref 8.7–10.2)
CHLORIDE SERPL-SCNC: 105 MMOL/L (ref 96–106)
CHOLEST SERPL-MCNC: 219 MG/DL (ref 100–199)
CO2 SERPL-SCNC: 20 MMOL/L (ref 20–29)
CREAT SERPL-MCNC: 1.02 MG/DL (ref 0.57–1)
GLOBULIN SER-MCNC: 1.9 G/DL (ref 1.5–4.5)
GLUCOSE SERPL-MCNC: 86 MG/DL (ref 65–99)
HDLC SERPL-MCNC: 54 MG/DL
LDLC SERPL CALC-MCNC: 147 MG/DL (ref 0–99)
POTASSIUM SERPL-SCNC: 4.9 MMOL/L (ref 3.5–5.2)
PROT SERPL-MCNC: 6 G/DL (ref 6–8.5)
SL AMB EGFR AFRICAN AMERICAN: 73 ML/MIN/1.73
SL AMB EGFR NON AFRICAN AMERICAN: 63 ML/MIN/1.73
SODIUM SERPL-SCNC: 139 MMOL/L (ref 134–144)
T3FREE SERPL-MCNC: 3 PG/ML (ref 2–4.4)
T4 FREE SERPL-MCNC: 1.19 NG/DL (ref 0.82–1.77)
TRIGL SERPL-MCNC: 92 MG/DL (ref 0–149)
TSH SERPL DL<=0.005 MIU/L-ACNC: 2.73 UIU/ML (ref 0.45–4.5)

## 2019-12-09 ENCOUNTER — APPOINTMENT (OUTPATIENT)
Dept: RADIOLOGY | Facility: OTHER | Age: 54
End: 2019-12-09
Payer: COMMERCIAL

## 2019-12-09 DIAGNOSIS — M25.512 CHRONIC LEFT SHOULDER PAIN: ICD-10-CM

## 2019-12-09 DIAGNOSIS — G89.29 CHRONIC LEFT SHOULDER PAIN: ICD-10-CM

## 2019-12-09 PROBLEM — M75.42 IMPINGEMENT SYNDROME OF LEFT SHOULDER: Status: ACTIVE | Noted: 2019-12-09

## 2019-12-09 PROCEDURE — 73030 X-RAY EXAM OF SHOULDER: CPT

## 2020-01-07 ENCOUNTER — EVALUATION (OUTPATIENT)
Dept: PHYSICAL THERAPY | Facility: MEDICAL CENTER | Age: 55
End: 2020-01-07
Payer: COMMERCIAL

## 2020-01-07 DIAGNOSIS — M75.42 IMPINGEMENT SYNDROME OF LEFT SHOULDER: Primary | ICD-10-CM

## 2020-01-07 PROCEDURE — 97110 THERAPEUTIC EXERCISES: CPT | Performed by: PHYSICAL THERAPIST

## 2020-01-07 PROCEDURE — 97161 PT EVAL LOW COMPLEX 20 MIN: CPT | Performed by: PHYSICAL THERAPIST
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
## 2020-01-21 ENCOUNTER — OFFICE VISIT (OUTPATIENT)
Dept: PHYSICAL THERAPY | Facility: MEDICAL CENTER | Age: 55
End: 2020-01-21
Payer: COMMERCIAL

## 2020-01-21 DIAGNOSIS — M75.42 IMPINGEMENT SYNDROME OF LEFT SHOULDER: Primary | ICD-10-CM

## 2020-01-21 PROCEDURE — 97110 THERAPEUTIC EXERCISES: CPT | Performed by: PHYSICAL THERAPIST

## 2020-01-21 NOTE — PROGRESS NOTES
Daily Note     Today's date: 2020  Patient name: King Valentine  : 1965  MRN: [id number]  Referring provider: Scottie Mulligan MD  Dx:   Encounter Diagnosis     ICD-10-CM    1  Impingement syndrome of left shoulder M75 42            Addendum from 20: Patient is self-discharged from PT  She called in to cancel remaining appointment due to resolution of symptoms  Subjective: Patient reports that she is feeling much better since last visit, and she has been consistent with her HEP  She reports an improvement in overhead mobility and behind the back reaching  She has been having some occasional neck pain and occasional shoulder pain at times  Objective: See treatment diary below      Assessment: Patient demonstrated a significant improvement in L shoulder AROM all planes since last visit with the most noted improvement in IR ROM behind the back  Added supine stick FLX AAROM with a positive response of an additional improvement in FLX PROM post-treatment  Able to progress resistance for scapular retractions with mod cueing required to prevent UT compensation  Patient would benefit from continued PT to improve shoulder girdle mobility and postural control to return to PLOF  Will follow-up in 2 weeks for potential d/c to HEP  Plan: Continue per plan of care  Follow-up in 2 weeks for potential d/c to HEP       Precautions: hypothyroidism      Manual                                                                                  Exercise Diary             Supine DNF HEP 30x5:           Pulleys  6 min           Scap retractions HEP 15 YTB           Low rows  NV           Robberies             Lawn mowers             Prone row             Prone ext             Seated UT stretch HEP HEP           UBE             Supine stick FLX AAROM  20x5: Modalities  1/7 1/21

## 2020-01-22 DIAGNOSIS — G43.709 CHRONIC MIGRAINE WITHOUT AURA WITHOUT STATUS MIGRAINOSUS, NOT INTRACTABLE: ICD-10-CM

## 2020-01-22 RX ORDER — TOPIRAMATE 25 MG/1
TABLET ORAL
Qty: 270 TABLET | Refills: 3 | Status: SHIPPED | OUTPATIENT
Start: 2020-01-22 | End: 2020-12-26

## 2020-02-06 ENCOUNTER — ANNUAL EXAM (OUTPATIENT)
Dept: OBGYN CLINIC | Facility: CLINIC | Age: 55
End: 2020-02-06
Payer: COMMERCIAL

## 2020-02-06 VITALS — WEIGHT: 190 LBS | DIASTOLIC BLOOD PRESSURE: 70 MMHG | SYSTOLIC BLOOD PRESSURE: 124 MMHG | BODY MASS INDEX: 30.21 KG/M2

## 2020-02-06 DIAGNOSIS — Z12.31 ENCOUNTER FOR SCREENING MAMMOGRAM FOR BREAST CANCER: ICD-10-CM

## 2020-02-06 DIAGNOSIS — Z01.419 ENCOUNTER FOR ANNUAL ROUTINE GYNECOLOGICAL EXAMINATION: Primary | ICD-10-CM

## 2020-02-06 PROCEDURE — 99396 PREV VISIT EST AGE 40-64: CPT | Performed by: OBSTETRICS & GYNECOLOGY

## 2020-02-06 RX ORDER — TOPIRAMATE 25 MG/1
TABLET ORAL
COMMUNITY
Start: 2020-01-22 | End: 2022-01-19

## 2020-02-06 NOTE — PROGRESS NOTES
Assessment/Plan:    pap is up to date    mammogram reviewed with her including breast density  RX given for March    Discussed self breast exams    Hot flashes - we discussed hot flashes in great detail  We reviewed conservative options to manage this such as exercise, avoiding triggers of hot flashes, and dietary changes  We discussed over-the-counter treatments such as soy or black cohosh       colon cancer screening - colonoscopy done last year, she is good for 10 years    discussed preventive care, regular exercise and a healthy diet, encouraged her to continue with her weight loss efforts      No problem-specific Assessment & Plan notes found for this encounter  Diagnoses and all orders for this visit:    Encounter for annual routine gynecological examination    Encounter for screening mammogram for breast cancer  -     Mammo screening bilateral w 3d & cad; Future    Other orders  -     topiramate (TOPAMAX) 25 mg tablet          Subjective:      Patient ID: Leodan Shah is a 48 y o  female  Patient here for yearly  LMP August 2018, still having hot flashes, they were bad but are now manageable  Normal Pap and negative HPV in February 2019  Normal 3D mammogram in March 2019 showed fatty breast tissue and an average risk  She is still not smoking  She gained back much of the weight she lost   She is undergoing treatment for a shoulder injury  The following portions of the patient's history were reviewed and updated as appropriate: allergies, current medications, past family history, past medical history, past social history, past surgical history and problem list     Review of Systems   Constitutional: Negative  Gastrointestinal: Negative  Genitourinary: Negative  Objective:      /70 (BP Location: Left arm, Patient Position: Sitting, Cuff Size: Standard)   Wt 86 2 kg (190 lb)   BMI 30 21 kg/m²          Physical Exam   Constitutional: She appears well-developed  Neck: No tracheal deviation present  No thyromegaly present  Cardiovascular: Normal rate and regular rhythm  Pulmonary/Chest: Effort normal and breath sounds normal  Right breast exhibits no inverted nipple, no mass, no nipple discharge, no skin change and no tenderness  Left breast exhibits no inverted nipple, no mass, no nipple discharge, no skin change and no tenderness  Breasts are symmetrical    Examined seated and supine   Abdominal: Soft  She exhibits no distension and no mass  There is no tenderness  Genitourinary: Rectum normal, vagina normal and uterus normal  No labial fusion  There is no rash, tenderness, lesion or injury on the right labia  There is no rash, tenderness, lesion or injury on the left labia  Cervix exhibits no motion tenderness, no discharge and no friability  Right adnexum displays no mass, no tenderness and no fullness  Left adnexum displays no mass, no tenderness and no fullness  Vitals reviewed

## 2020-06-02 ENCOUNTER — HOSPITAL ENCOUNTER (OUTPATIENT)
Dept: MAMMOGRAPHY | Facility: MEDICAL CENTER | Age: 55
Discharge: HOME | End: 2020-06-02
Payer: COMMERCIAL

## 2020-06-02 VITALS — BODY MASS INDEX: 29.82 KG/M2 | HEIGHT: 67 IN | WEIGHT: 190 LBS

## 2020-06-02 DIAGNOSIS — Z12.31 ENCOUNTER FOR SCREENING MAMMOGRAM FOR BREAST CANCER: ICD-10-CM

## 2020-06-02 PROCEDURE — 77067 SCR MAMMO BI INCL CAD: CPT

## 2020-06-02 PROCEDURE — 77063 BREAST TOMOSYNTHESIS BI: CPT

## 2020-06-04 DIAGNOSIS — R92.8 ABNORMAL MAMMOGRAM: Primary | ICD-10-CM

## 2020-06-16 ENCOUNTER — HOSPITAL ENCOUNTER (OUTPATIENT)
Dept: MAMMOGRAPHY | Facility: CLINIC | Age: 55
Discharge: HOME | End: 2020-06-16
Payer: COMMERCIAL

## 2020-06-16 VITALS — BODY MASS INDEX: 30.21 KG/M2 | HEIGHT: 67 IN

## 2020-06-16 DIAGNOSIS — R92.8 ABNORMAL MAMMOGRAM: ICD-10-CM

## 2020-06-16 PROCEDURE — G0279 TOMOSYNTHESIS, MAMMO: HCPCS

## 2020-06-16 PROCEDURE — 77065 DX MAMMO INCL CAD UNI: CPT

## 2020-06-16 PROCEDURE — 76642 ULTRASOUND BREAST LIMITED: CPT

## 2020-12-25 DIAGNOSIS — G43.709 CHRONIC MIGRAINE WITHOUT AURA WITHOUT STATUS MIGRAINOSUS, NOT INTRACTABLE: ICD-10-CM

## 2020-12-26 RX ORDER — TOPIRAMATE 25 MG/1
TABLET ORAL
Qty: 270 TABLET | Refills: 3 | Status: SHIPPED | OUTPATIENT
Start: 2020-12-26 | End: 2022-04-07 | Stop reason: SDUPTHER

## 2021-03-10 DIAGNOSIS — Z23 ENCOUNTER FOR IMMUNIZATION: ICD-10-CM

## 2021-03-17 ENCOUNTER — IMMUNIZATIONS (OUTPATIENT)
Dept: FAMILY MEDICINE CLINIC | Facility: HOSPITAL | Age: 56
End: 2021-03-17

## 2021-03-17 DIAGNOSIS — Z23 ENCOUNTER FOR IMMUNIZATION: Primary | ICD-10-CM

## 2021-03-17 PROCEDURE — 0001A SARS-COV-2 / COVID-19 MRNA VACCINE (PFIZER-BIONTECH) 30 MCG: CPT

## 2021-03-17 PROCEDURE — 91300 SARS-COV-2 / COVID-19 MRNA VACCINE (PFIZER-BIONTECH) 30 MCG: CPT

## 2021-04-08 ENCOUNTER — IMMUNIZATIONS (OUTPATIENT)
Dept: FAMILY MEDICINE CLINIC | Facility: HOSPITAL | Age: 56
End: 2021-04-08

## 2021-04-08 DIAGNOSIS — Z23 ENCOUNTER FOR IMMUNIZATION: Primary | ICD-10-CM

## 2021-04-08 PROCEDURE — 91300 SARS-COV-2 / COVID-19 MRNA VACCINE (PFIZER-BIONTECH) 30 MCG: CPT

## 2021-04-08 PROCEDURE — 0002A SARS-COV-2 / COVID-19 MRNA VACCINE (PFIZER-BIONTECH) 30 MCG: CPT

## 2021-06-01 NOTE — PROGRESS NOTES
Assessment/Plan:    pap is up to date    mammogram reviewed with her including breast density  RX given so she can schedule     Discussed self breast exams    colon cancer screening - colonoscopy done in 2019, recall in 10 years  discussed preventive care, regular exercise and a healthy diet    Vaginal dryness - discussed OTC moisturizers      No problem-specific Assessment & Plan notes found for this encounter  Diagnoses and all orders for this visit:    Encounter for annual routine gynecological examination    Encounter for screening mammogram for breast cancer  -     Mammo screening bilateral w 3d & cad; Future          Subjective:      Patient ID: Maurilio Murdock is a 50 y o  female  Pt here for yearly  She is having vaginal dryness  She is not sexually active  No other gyn complaints  3D mammogram last June required a recall for the left breast   It was a cyst and she can now return to routine screening  She had fatty tissue and average risk  Normal pap and negative HPV in 2-2019  The following portions of the patient's history were reviewed and updated as appropriate: allergies, current medications, past family history, past medical history, past social history, past surgical history and problem list     Review of Systems   Constitutional: Negative  Gastrointestinal: Negative  Genitourinary: Negative  Vaginal dryness         Objective:      LMP 08/29/2018 (Exact Date)          Physical Exam  Vitals signs reviewed  Constitutional:       Appearance: She is well-developed  Neck:      Thyroid: No thyromegaly  Trachea: No tracheal deviation  Cardiovascular:      Rate and Rhythm: Normal rate and regular rhythm  Pulmonary:      Effort: Pulmonary effort is normal       Breath sounds: Normal breath sounds  Chest:      Breasts: Breasts are symmetrical          Right: No inverted nipple, mass, nipple discharge, skin change or tenderness           Left: No inverted nipple, mass, nipple discharge, skin change or tenderness  Abdominal:      General: There is no distension  Palpations: Abdomen is soft  There is no mass  Tenderness: There is no abdominal tenderness  Genitourinary:     Labia:         Right: No rash, tenderness, lesion or injury  Left: No rash, tenderness, lesion or injury  Vagina: Normal       Cervix: No cervical motion tenderness, discharge or friability  Adnexa:         Right: No mass, tenderness or fullness  Left: No mass, tenderness or fullness          Rectum: Normal

## 2021-06-03 ENCOUNTER — ANNUAL EXAM (OUTPATIENT)
Dept: OBGYN CLINIC | Facility: CLINIC | Age: 56
End: 2021-06-03
Payer: COMMERCIAL

## 2021-06-03 VITALS
SYSTOLIC BLOOD PRESSURE: 124 MMHG | BODY MASS INDEX: 36.38 KG/M2 | WEIGHT: 226.4 LBS | HEIGHT: 66 IN | DIASTOLIC BLOOD PRESSURE: 62 MMHG

## 2021-06-03 DIAGNOSIS — Z12.31 ENCOUNTER FOR SCREENING MAMMOGRAM FOR BREAST CANCER: ICD-10-CM

## 2021-06-03 DIAGNOSIS — Z01.419 ENCOUNTER FOR ANNUAL ROUTINE GYNECOLOGICAL EXAMINATION: Primary | ICD-10-CM

## 2021-06-03 DIAGNOSIS — N95.1 VAGINAL DRYNESS, MENOPAUSAL: ICD-10-CM

## 2021-06-03 PROCEDURE — 3008F BODY MASS INDEX DOCD: CPT | Performed by: OBSTETRICS & GYNECOLOGY

## 2021-06-03 PROCEDURE — 99396 PREV VISIT EST AGE 40-64: CPT | Performed by: OBSTETRICS & GYNECOLOGY

## 2021-07-23 ENCOUNTER — HOSPITAL ENCOUNTER (OUTPATIENT)
Dept: MAMMOGRAPHY | Facility: MEDICAL CENTER | Age: 56
Discharge: HOME | End: 2021-07-23
Payer: COMMERCIAL

## 2021-07-23 VITALS — HEIGHT: 66 IN | WEIGHT: 226 LBS | BODY MASS INDEX: 36.32 KG/M2

## 2021-07-23 DIAGNOSIS — Z12.31 ENCOUNTER FOR SCREENING MAMMOGRAM FOR BREAST CANCER: ICD-10-CM

## 2021-07-23 PROCEDURE — 77063 BREAST TOMOSYNTHESIS BI: CPT

## 2021-07-23 PROCEDURE — 77067 SCR MAMMO BI INCL CAD: CPT

## 2021-10-23 ENCOUNTER — IMMUNIZATIONS (OUTPATIENT)
Dept: FAMILY MEDICINE CLINIC | Facility: MEDICAL CENTER | Age: 56
End: 2021-10-23

## 2021-10-23 DIAGNOSIS — Z23 ENCOUNTER FOR IMMUNIZATION: Primary | ICD-10-CM

## 2021-10-23 PROCEDURE — 91300 SARSCOV2 VAC 30MCG/0.3ML IM: CPT

## 2021-11-17 DIAGNOSIS — G43.829 MENSTRUAL MIGRAINE WITHOUT STATUS MIGRAINOSUS, NOT INTRACTABLE: Primary | ICD-10-CM

## 2021-11-18 RX ORDER — TOPIRAMATE 25 MG/1
TABLET ORAL
Qty: 270 TABLET | Refills: 3 | Status: SHIPPED | OUTPATIENT
Start: 2021-11-18 | End: 2022-01-19

## 2021-12-14 ENCOUNTER — OFFICE VISIT (OUTPATIENT)
Dept: FAMILY MEDICINE CLINIC | Facility: CLINIC | Age: 56
End: 2021-12-14
Payer: COMMERCIAL

## 2021-12-14 VITALS
TEMPERATURE: 96.9 F | DIASTOLIC BLOOD PRESSURE: 76 MMHG | BODY MASS INDEX: 35.63 KG/M2 | OXYGEN SATURATION: 96 % | WEIGHT: 227 LBS | HEART RATE: 85 BPM | SYSTOLIC BLOOD PRESSURE: 122 MMHG | HEIGHT: 67 IN
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
## 2021-12-14 DIAGNOSIS — S61.451A CAT BITE OF HAND, RIGHT, INITIAL ENCOUNTER: Primary | ICD-10-CM

## 2021-12-14 PROCEDURE — 99213 OFFICE O/P EST LOW 20 MIN: CPT | Performed by: FAMILY MEDICINE

## 2021-12-14 RX ORDER — CEPHALEXIN 500 MG/1
500 CAPSULE ORAL EVERY 8 HOURS SCHEDULED
Qty: 30 CAPSULE | Refills: 0 | Status: SHIPPED | OUTPATIENT
Start: 2021-12-14 | End: 2021-12-24

## 2021-12-14 NOTE — PROGRESS NOTES
Assessment/Plan:    Patient will schedule annual overdue physical   She may be changing jobs insurance so she wants to clarify that 1st      Diagnoses and all orders for this visit:    Cat bite of hand, right, initial encounter  -     cephalexin (KEFLEX) 500 mg capsule; Take 1 capsule (500 mg total) by mouth every 8 (eight) hours for 10 days          Subjective:   Chief Complaint   Patient presents with    Animal Bite     possible infected cat bite, Pt states she was bit on Saturday, it is warm to the touch and has a lump underneath       Patient ID: Louis Pennington is a 49 y o  female  17-year-old female here for evaluation of cat bite  She has had the cat for quite some time  Cat does not go outside  Home rescued cat---has anxiety, has all her shots    The following portions of the patient's history were reviewed and updated as appropriate: allergies, current medications, past family history, past medical history, past social history, past surgical history and problem list       Review of Systems   Skin:        Had initial swelling within about an hour so of after cat bite, actually  today  She did clean out the bite marks immediately  Psychiatric/Behavioral:        Work stress         Objective:      /76   Pulse 85   Temp (!) 96 9 °F (36 1 °C) (Temporal)   Ht 5' 6 5" (1 689 m)   Wt 103 kg (227 lb)   LMP 08/29/2018 (Exact Date)   SpO2 96%   BMI 36 09 kg/m²          Physical Exam  Constitutional:       Appearance: Normal appearance  Cardiovascular:      Rate and Rhythm: Normal rate  Pulmonary:      Effort: Pulmonary effort is normal    Skin:     General: Skin is warm  Findings: Erythema (Minimal with minimal edema and no streaking) present  Comments: Puncture wounds noted on right hand forearm   Neurological:      Mental Status: She is alert and oriented to person, place, and time     Psychiatric:      Comments: Work stress           Media Information             Document Information    Clinical Image - Mobile Device   Right forearm cat bite   12/14/2021 11:49 AM   Attached To:    Office Visit on 12/14/21 with Caity Momin, [address] [place] [address] [address], [facility]

## 2022-01-19 ENCOUNTER — OFFICE VISIT (OUTPATIENT)
Dept: FAMILY MEDICINE CLINIC | Facility: CLINIC | Age: 57
End: 2022-01-19
Payer: COMMERCIAL

## 2022-01-19 VITALS
BODY MASS INDEX: 36.19 KG/M2 | RESPIRATION RATE: 16 BRPM | SYSTOLIC BLOOD PRESSURE: 102 MMHG | WEIGHT: 230.6 LBS | HEIGHT: 67 IN | OXYGEN SATURATION: 99 % | HEART RATE: 69 BPM | DIASTOLIC BLOOD PRESSURE: 60 MMHG | TEMPERATURE: 97.2 F

## 2022-01-19 DIAGNOSIS — E53.8 VITAMIN B12 DEFICIENCY: ICD-10-CM

## 2022-01-19 DIAGNOSIS — E78.01 FAMILIAL HYPERCHOLESTEROLEMIA: ICD-10-CM

## 2022-01-19 DIAGNOSIS — Z00.00 WELL ADULT EXAM: Primary | ICD-10-CM

## 2022-01-19 DIAGNOSIS — E03.9 ACQUIRED HYPOTHYROIDISM: ICD-10-CM

## 2022-01-19 DIAGNOSIS — E55.9 VITAMIN D DEFICIENCY: ICD-10-CM

## 2022-01-19 PROCEDURE — 99396 PREV VISIT EST AGE 40-64: CPT | Performed by: FAMILY MEDICINE

## 2022-01-19 PROCEDURE — 1036F TOBACCO NON-USER: CPT | Performed by: FAMILY MEDICINE

## 2022-01-19 PROCEDURE — 3008F BODY MASS INDEX DOCD: CPT | Performed by: FAMILY MEDICINE

## 2022-01-19 NOTE — PROGRESS NOTES
Answers for HPI/ROS submitted by the patient on 1/12/2022  How would you rate your overall health?: good  Compared to last year, how is your physical health?: slightly worse  In general, how satisfied are you with your life?: satisfied  Compared to last year, how is your eyesight?: slightly worse  Compared to last year, how is your hearing?: same  Compared to last year, how is your emotional/mental health?: same  How often is anger a problem for you?: sometimes  How often do you feel unusually tired/fatigued?: sometimes  In the past 7 days, how much pain have you experienced?: some  If you answered "some" or "a lot", please rate the severity of your pain on a scale of 1 to 10 (1 being the least severe pain and 10 being the most intense pain)  : 4/10  In the past 6 months, have you lost or gained 10 pounds without trying?: Yes  One or more falls in the last year: No  In the past 6 months, have you accidentally leaked urine?: No  Do you have trouble with the stairs inside or outside your home?: Yes  Does your home have working smoke alarms?: Yes  Does your home have a carbon monoxide monitor?: Yes  Which safety hazards (if any) have you experienced in your home? Please select all that apply : none  How would you describe your current diet?  Please select all that apply : Regular  In addition to prescription medications, are you taking any over-the-counter supplements?: Yes  If yes, what supplements are you taking?: VitaminD, Vitamin B  Can you manage your medications?: Yes  Are you currently taking any opioid medications?: No  Can you walk and transfer into and out of your bed and chair?: Yes  Can you dress and groom yourself?: Yes  Can you bathe or shower yourself?: Yes  Can you feed yourself?: Yes  Can you do your laundry/ housekeeping?: Yes  Can you manage your money, pay your bills, and track your expenses?: Yes  Can you make your own meals?: Yes  Can you do your own shopping?: Yes  Within the last 12 months, have you had any hospitalizations or Emergency Department visits?: No  Do you have a living will?: Yes  Do you have a Durable POA (Power of ) for healthcare decisions?: Yes  Do you have an Advanced Directive for end of life decisions?: Yes  How often have you used an illegal drug (including marijuana) or a prescription medication for non-medical reasons in the past year?: never  What is the typical number of drinks you consume in a day?: 0  What is the typical number of drinks you consume in a week?: 0  How often did you have a drink containing alcohol in the past year?: never  How many drinks did you have on a typical day  when you were drinking in the past year?: 0  How often did you have 6 or more drinks on one occasion in the past year?: never    [address] [facility]    NAME: Dawit Cotton  AGE: 49 y o  SEX: female  : 1965     DATE: 2022     Assessment and Plan:   Robbin Neal was seen today for well check  Diagnoses and all orders for this visit:    Well adult exam    Familial hypercholesterolemia  -     Lipid Panel with Direct LDL reflex; Future  -     Comprehensive metabolic panel; Future    Acquired hypothyroidism  -     TSH, 3rd generation; Future  -     T4, free; Future    Vitamin D deficiency  -     Vitamin D 25 hydroxy; Future    Vitamin B12 deficiency  -     Vitamin B12; Future     update basic blood work  The patient is asked to make an attempt to improve diet and exercise patterns to aid in medical management of this problem    Problem List Items Addressed This Visit        Endocrine    Hypothyroidism    Relevant Orders    TSH, 3rd generation    T4, free       Other    Hyperlipidemia    Relevant Orders    Lipid Panel with Direct LDL reflex    Comprehensive metabolic panel      Other Visit Diagnoses     Well adult exam    -  Primary    Vitamin D deficiency        Relevant Orders    Vitamin D 25 hydroxy Vitamin B12 deficiency        Relevant Orders    Vitamin B12          Immunizations and preventive care screenings were discussed with patient today  Appropriate education was printed on patient's after visit summary  Counseling:  Alcohol/drug use: discussed moderation in alcohol intake, the recommendations for healthy alcohol use  Dental Health: discussed importance of regular tooth brushing, flossing, and dental visits  Injury prevention: discussed safety/seat belts, safety helmets, smoke detectors, carbon dioxide detectors, and smoking near bedding or upholstery  Sexual health: not in a relationship  · Exercise: the importance of regular exercise/physical activity was discussed  Recommend exercise 3-5 times per week for at least 30 minutes  BMI Counseling: Body mass index is 36 66 kg/m²  The BMI is above normal  Nutrition recommendations include decreasing portion sizes, encouraging healthy choices of fruits and vegetables, decreasing fast food intake, consuming healthier snacks, limiting drinks that contain sugar, moderation in carbohydrate intake, increasing intake of lean protein, reducing intake of saturated and trans fat and reducing intake of cholesterol  Exercise recommendations include exercising 3-5 times per week  Patient referred to PCP  Rationale for BMI follow-up plan is due to patient being overweight or obese  No follow-ups on file  Chief Complaint:     Chief Complaint   Patient presents with    Well Check      History of Present Illness:     Adult Annual Physical   Patient here for a comprehensive physical exam  The patient reports due for BW   COVID vaccinated and had 3rd booster  Lips thing persists for 1-2 weeks, at night-time , subtle, none external, UPPER LIP only   Starts when winding down-quivering  Sleep is off since menopause  Lip does occasionally wake her up---first time it happened was 1 year ago  D3 and B12 supplement  HR goes up with minimal effort    Diet and Physical Activity  · Diet/Nutrition: well balanced diet and just too many calories  · Exercise: no formal exercise  Depression Screening  PHQ-2/9 Depression Screening    Little interest or pleasure in doing things: 0 - not at all  Feeling down, depressed, or hopeless: 0 - not at all       [address] [place] [address]  · Sleep: variable  · Hearing: normal - bilateral   · Vision: wears glasses  · Dental: regular dental visits  /GYN Health  · Patient is: postmenopausal  · Last menstrual period: 3 years, no hot flush, gets up to pee  · Contraceptive method:NA  Review of Systems:     Review of Systems   Constitutional: Unexpected weight change: Gain  As noted above in HPI  Respiratory: Shortness of breath:  out of fitness         Past Medical History:     Past Medical History:   Diagnosis Date    Abnormal Pap smear of cervix     Blood pressure elevated without history of HTN 10/16/2012    Resolved:  September 30, 2016    Encounter for surveillance of contraceptives 12/03/2013    Resolved:  November 14, 2017    Sundeep Fedeaw bladder polyp 09/27/2012    Resolved:  November 14, 2017    Hemorrhoids 11/14/2017    Impaired fasting glucose 10/18/2012    Resolved:  November 14, 2017    Locking of left knee 08/01/2014    Resolved:  November 14, 2017    Migraine 09/27/2012    Resolved:  November 14, 2017    Oligomenorrhea 05/02/2013    Resolved:  November 14, 2017    Osteoarthritis     Vitamin B12 deficiency 10/08/2012    Resolved:  November 14, 2017    Vitamin D deficiency 10/18/2012    Resolved:  November 14, 2017      Past Surgical History:     Past Surgical History:   Procedure Laterality Date    CHOLECYSTECTOMY  01/07/2009    EXPLORATORY LAPAROTOMY  06/24/2014    EYE SURGERY  06/24/2014    GALLBLADDER SURGERY      MOUTH SURGERY      Tooth Extraction    [place] COLONOSCOPY FLX DX W/COLLJ SPEC WHEN PFRMD N/A 4/30/2019    Procedure: COLONOSCOPY;  Surgeon: Silverio Ordoñez MD;  Location: [facility]; Service: Gastroenterology      Social History:     Social History     Socioeconomic History    Marital status: Significant Other     Spouse name: None    Number of children: None    Years of education: None    Highest education level: None   Occupational History    None   Tobacco Use    Smoking status: Former Smoker     Packs/day: 0 50     Quit date: 2019     Years since quittin 1    Smokeless tobacco: Never Used   Vaping Use    Vaping Use: Never used   Substance and Sexual Activity    Alcohol use: No    Drug use: No    Sexual activity: Not Currently     Partners: Male     Birth control/protection: Condom Male   Other Topics Concern    None   Social History Narrative    Caffeine use    Denied:   - no children    Denominational Affiliation Wicca    Uses Safety Equipment - Seatbelts     Social Determinants of Health     Financial Resource Strain: Not on file   Food Insecurity: Not on file   Transportation Needs: Not on file   Physical Activity: Not on file   Stress: Not on file   Social Connections: Not on file   Intimate Partner Violence: Not on file   Housing Stability: Not on file      Family History:     Family History   Problem Relation Age of Onset    Lupus Mother         Systemic Lupus Erythematosus    Hyperlipidemia Mother     Migraines Mother     Stroke Mother     Lymphoma Father         B-cell Lymphoma    Hyperlipidemia Father     Stroke Father     Prostate cancer Father     Diabetes Paternal Grandmother     Asthma Paternal Grandmother     Leukemia Maternal Grandfather     Acute lymphoblastic leukemia Maternal Grandfather     No Known Problems Maternal Grandmother     No Known Problems Paternal Grandfather     Breast cancer Other     No Known Problems Half-Sister     No Known Problems Half-Sister       Current Medications:     Current Outpatient Medications   Medication Sig Dispense Refill    diclofenac sodium (VOLTAREN) 1 % Apply 2 g topically 4 (four) times a day (Patient taking differently: Apply 2 g topically as needed  ) 100 g 0    rizatriptan (MAXALT) 10 MG tablet Take 10 mg by mouth once as needed for migraine May repeat in 2 hours if needed      topiramate (TOPAMAX) 25 mg tablet TAKE 3 TABLETS BY MOUTH  DAILY 270 tablet 3     No current facility-administered medications for this visit  Allergies: Allergies   Allergen Reactions    Darvon [Propoxyphene]     Other      Pt has an allergy to pickles      Physical Exam:     /60   Pulse 69   Temp (!) 97 2 °F (36 2 °C) (Temporal)   Resp 16   Ht 5' 6 5" (1 689 m)   Wt 105 kg (230 lb 9 6 oz)   LMP 08/29/2018 (Exact Date)   SpO2 99%   BMI 36 66 kg/m²     Physical Exam  Vitals and nursing note reviewed  Constitutional:       General: She is not in acute distress  Appearance: Normal appearance  She is well-developed  Comments: 82-year-old female appears age BMI 39   HENT:      Head: Normocephalic and atraumatic  Right Ear: Tympanic membrane normal       Left Ear: Tympanic membrane normal       Nose: Nose normal       Mouth/Throat:      Lips: Lesions: Subtle Venous [place]       Mouth: Mucous membranes are moist       Comments: Tongue midline  Eyes:      Conjunctiva/sclera: Conjunctivae normal       Pupils: Pupils are equal, round, and reactive to light  Cardiovascular:      Rate and Rhythm: Normal rate and regular rhythm  Heart sounds: No murmur heard  Pulmonary:      Effort: Pulmonary effort is normal  No respiratory distress  Breath sounds: Normal breath sounds  Abdominal:      General: Bowel sounds are normal       Palpations: Abdomen is soft  Tenderness: There is no abdominal tenderness  Musculoskeletal:         General: Normal range of motion  Cervical back: Neck supple  Skin:     General: Skin is warm and dry  Neurological:      Mental Status: She is alert and oriented to person, place, and time  Motor: No weakness        Coordination: Coordination normal       Gait: Gait normal       Deep Tendon Reflexes: Reflexes normal       Comments: Normal finger to nose is negative Romberg sensation intact   Psychiatric:         Mood and Affect: Mood normal          Behavior: Behavior normal          Thought Content:  Thought content normal          Judgment: Judgment normal           DO MEL Callejas [facility] [place] [facility]

## 2022-04-07 DIAGNOSIS — G43.709 CHRONIC MIGRAINE WITHOUT AURA WITHOUT STATUS MIGRAINOSUS, NOT INTRACTABLE: ICD-10-CM

## 2022-04-07 RX ORDER — TOPIRAMATE 25 MG/1
TABLET ORAL
Qty: 90 TABLET | Refills: 0 | Status: SHIPPED | OUTPATIENT
Start: 2022-04-07 | End: 2022-05-13 | Stop reason: SDUPTHER

## 2022-05-13 DIAGNOSIS — G43.709 CHRONIC MIGRAINE WITHOUT AURA WITHOUT STATUS MIGRAINOSUS, NOT INTRACTABLE: ICD-10-CM

## 2022-05-13 RX ORDER — TOPIRAMATE 25 MG/1
TABLET ORAL
Qty: 90 TABLET | Refills: 0 | Status: SHIPPED | OUTPATIENT
Start: 2022-05-13 | End: 2022-05-31 | Stop reason: SDUPTHER

## 2022-05-17 DIAGNOSIS — R39.9 UTI SYMPTOMS: Primary | ICD-10-CM

## 2022-05-17 RX ORDER — SULFAMETHOXAZOLE AND TRIMETHOPRIM 800; 160 MG/1; MG/1
1 TABLET ORAL 2 TIMES DAILY
Qty: 6 TABLET | Refills: 0 | Status: SHIPPED | OUTPATIENT
Start: 2022-05-17 | End: 2022-05-20

## 2022-05-31 DIAGNOSIS — G43.709 CHRONIC MIGRAINE WITHOUT AURA WITHOUT STATUS MIGRAINOSUS, NOT INTRACTABLE: ICD-10-CM

## 2022-05-31 RX ORDER — TOPIRAMATE 25 MG/1
TABLET ORAL
Qty: 270 TABLET | Refills: 1 | Status: SHIPPED | OUTPATIENT
Start: 2022-05-31

## 2022-12-20 DIAGNOSIS — G43.709 CHRONIC MIGRAINE WITHOUT AURA WITHOUT STATUS MIGRAINOSUS, NOT INTRACTABLE: ICD-10-CM

## 2022-12-20 RX ORDER — TOPIRAMATE 25 MG/1
TABLET ORAL
Qty: 270 TABLET | Refills: 1 | Status: SHIPPED | OUTPATIENT
Start: 2022-12-20

## 2023-02-17 ENCOUNTER — OFFICE VISIT (OUTPATIENT)
Dept: FAMILY MEDICINE CLINIC | Facility: CLINIC | Age: 58
End: 2023-02-17

## 2023-02-17 DIAGNOSIS — K52.9 COLITIS: Primary | ICD-10-CM

## 2023-02-17 DIAGNOSIS — Z12.31 ENCOUNTER FOR SCREENING MAMMOGRAM FOR BREAST CANCER: ICD-10-CM

## 2023-02-17 RX ORDER — CHOLESTYRAMINE 4 G/9G
1 POWDER, FOR SUSPENSION ORAL DAILY
Qty: 30 EACH | Refills: 0 | Status: SHIPPED | OUTPATIENT
Start: 2023-02-17

## 2023-02-17 NOTE — PROGRESS NOTES
Name: Ghislaine Del Castillo      : 1965      MRN: [id number]  Encounter Provider: Rita Wiggins DO  Encounter Date: 2023   Encounter department: [facility]    Assessment & Plan     1  Colitis  -     pancrelipase, Lip-Prot-Amyl, (CREON) 6,000 units delayed release capsule; One daily  -     cholestyramine (QUESTRAN) 4 g packet; Take 1 packet (4 g total) by mouth in the morning    2  Encounter for screening mammogram for breast cancer    SLOW re-introduction of food over next few weeks, bland, increase hydration  RX as noted       Return for Recheck=== continue follow up weekly through [address] [facility]  BMI Counseling: Body mass index is 38 82 kg/m²  The BMI is above normal  Nutrition recommendations include decreasing portion sizes, encouraging healthy choices of fruits and vegetables, decreasing fast food intake, consuming healthier snacks, limiting drinks that contain sugar, moderation in carbohydrate intake, increasing intake of lean protein, reducing intake of saturated and trans fat and reducing intake of cholesterol  Exercise recommendations include exercising 3-5 times per week  Patient referred to PCP  Rationale for BMI follow-up plan is due to patient being overweight or obese  Depression Screening and Follow-up Plan: Patient was screened for depression during today's encounter  They screened negative with a PHQ-2 score of 1  Subjective     56year old female with GI issue (acute) as noted   Chief Complaint   Patient presents with   • Follow-up     Pt is here  for diarrhea back in  after eating at an [employer] in [facility]  Pt states that the symptoms lasted all week  Pt states everything she ate was going right through her  Pt has a chest cold  Pt tested negative for Covid  Pt states she has tried Ensure, oat meal, bananas  She can eat plain chicken and soup and mashed potatoes and tea w/ honey  Pt denies pain the episodes happen an hour after she eats      Dad passed suddenly-- [facility], mom had heart event now with ? Colon mass  She and siblings were there for about a week  Ate out most meals  Terrible food choices  Review of Systems   Constitutional: Negative for fever (minor)  HENT: Positive for congestion (chest cold)  Respiratory: Positive for cough (NOT COVID)  Gastrointestinal:        In [place]--terrible GI with ? ? Fast food  Doing a bland diet but not even tolerating "normal bland food"  ? Lactose, gluten? Psychiatric/Behavioral:        Struggling   Super stress job  [employer] employee  Past Medical History:   Diagnosis Date   • Abnormal Pap smear of cervix    • Blood pressure elevated without history of HTN 10/16/2012    Resolved:  September 30, 2016   • Encounter for surveillance of contraceptives 12/03/2013    Resolved:  November 14, 2017   • Gall bladder polyp 09/27/2012    Resolved:  November 14, 2017   • Hemorrhoids 11/14/2017   • Impaired fasting glucose 10/18/2012    Resolved:  November 14, 2017   • Locking of left knee 08/01/2014    Resolved:  November 14, 2017   • Migraine 09/27/2012    Resolved:  November 14, 2017   • Oligomenorrhea 05/02/2013    Resolved:  November 14, 2017   • Osteoarthritis    • Vitamin B12 deficiency 10/08/2012    Resolved:  November 14, 2017   • Vitamin D deficiency 10/18/2012    Resolved:  November 14, 2017     Past Surgical History:   Procedure Laterality Date   • CHOLECYSTECTOMY  01/07/2009   • EXPLORATORY LAPAROTOMY  06/24/2014   • EYE SURGERY  06/24/2014   • GALLBLADDER SURGERY     • MOUTH SURGERY      Tooth Extraction   • [place] COLONOSCOPY FLX DX W/COLLJ SPEC WHEN PFRMD N/A 4/30/2019    Procedure: COLONOSCOPY;  Surgeon: Juan Saunders MD;  Location: [facility];   Service: Gastroenterology     Family History   Problem Relation Age of Onset   • Lupus Mother         Systemic Lupus Erythematosus   • Hyperlipidemia Mother    • Migraines Mother    • Stroke Mother    • Lymphoma Father         B-cell Lymphoma   • Hyperlipidemia Father    • Stroke Father    • Prostate cancer Father    • Diabetes Paternal Grandmother    • Asthma Paternal Grandmother    • Leukemia Maternal Grandfather    • Acute lymphoblastic leukemia Maternal Grandfather    • No Known Problems Maternal Grandmother    • No Known Problems Paternal Grandfather    • Breast cancer Other    • No Known Problems Half-Sister    • No Known Problems Half-Sister      Social History     Socioeconomic History   • Marital status: Significant Other     Spouse name: None   • Number of children: None   • Years of education: None   • Highest education level: None   Occupational History   • None   Tobacco Use   • Smoking status: Former     Packs/day: 0 50     Types: Cigarettes     Quit date: 2019     Years since quitting: 3 2   • Smokeless tobacco: Never   Vaping Use   • Vaping Use: Never used   Substance and Sexual Activity   • Alcohol use: No   • Drug use: No   • Sexual activity: Not Currently     Partners: Male     Birth control/protection: Condom Male   Other Topics Concern   • None   Social History Narrative    Caffeine use    Denied:   - no children    Judaism Affiliation Wicca    Uses Safety Equipment - Seatbelts     Social Determinants of Health     Financial Resource Strain: Not on file   Food Insecurity: Not on file   Transportation Needs: Not on file   Physical Activity: Not on file   Stress: Not on file   Social Connections: Not on file   Intimate Partner Violence: Not on file   Housing Stability: Not on file     Current Outpatient Medications on File Prior to Visit   Medication Sig   • diclofenac sodium (VOLTAREN) 1 % Apply 2 g topically 4 (four) times a day (Patient taking differently: Apply 2 g topically as needed)   • rizatriptan (MAXALT) 10 MG tablet Take 10 mg by mouth once as needed for migraine May repeat in 2 hours if needed   • topiramate (TOPAMAX) 25 mg tablet TAKE 3 TABLETS DAILY     Allergies   Allergen Reactions   • Darvon [Propoxyphene]    • Other Pt has an allergy to pickles     Immunization History   Administered Date(s) Administered   • COVID-19 PFIZER VACCINE 0 3 ML IM 03/17/2021, 04/08/2021, 10/23/2021   • INFLUENZA 10/01/2021, 10/01/2022       Objective     /68   Pulse 90   Temp (!) 96 7 °F (35 9 °C) (Temporal)   Resp 20   Ht 5' 6 75" (1 695 m)   Wt 112 kg (246 lb)   LMP 08/29/2018 (Exact Date)   SpO2 97%   BMI 38 82 kg/m²     Physical Exam  Vitals reviewed  Constitutional:       General: She is not in acute distress  Appearance: Normal appearance  She is well-developed  HENT:      Head: Normocephalic  Eyes:      Conjunctiva/sclera: Conjunctivae normal       Pupils: Pupils are equal, round, and reactive to light  Cardiovascular:      Rate and Rhythm: Normal rate and regular rhythm  Heart sounds: No murmur heard  Pulmonary:      Effort: Pulmonary effort is normal       Breath sounds: Normal breath sounds  Abdominal:      General: Bowel sounds are normal       Palpations: Abdomen is soft  There is no hepatomegaly, splenomegaly, mass or pulsatile mass  Tenderness: There is abdominal tenderness (slight) in the epigastric area  There is no right CVA tenderness, left CVA tenderness or rebound  Hernia: No hernia is present  Musculoskeletal:         General: Normal range of motion  Skin:     General: Skin is warm and dry  Neurological:      Mental Status: She is alert and oriented to person, place, and time  Deep Tendon Reflexes: Reflexes are normal and symmetric  Psychiatric:         Mood and Affect: Mood is anxious  Behavior: Behavior normal          Thought Content:  Thought content normal          Judgment: Judgment normal        Evi Ellsworth DO

## 2023-02-23 VITALS
TEMPERATURE: 96.7 F | BODY MASS INDEX: 38.61 KG/M2 | RESPIRATION RATE: 20 BRPM | SYSTOLIC BLOOD PRESSURE: 122 MMHG | WEIGHT: 246 LBS | HEIGHT: 67 IN | OXYGEN SATURATION: 97 % | HEART RATE: 90 BPM | DIASTOLIC BLOOD PRESSURE: 68 MMHG

## 2023-05-25 DIAGNOSIS — G43.709 CHRONIC MIGRAINE WITHOUT AURA WITHOUT STATUS MIGRAINOSUS, NOT INTRACTABLE: ICD-10-CM

## 2023-05-25 RX ORDER — TOPIRAMATE 25 MG/1
TABLET ORAL
Qty: 270 TABLET | Refills: 1 | Status: SHIPPED | OUTPATIENT
Start: 2023-05-25

## 2023-05-25 NOTE — TELEPHONE ENCOUNTER
Requested medication(s) are due for refill today: Yes  Patient has already received a courtesy refill: No  Other reason request has been forwarded to provider:
Abdominal Pain, N/V/D

## 2023-10-10 DIAGNOSIS — G43.709 CHRONIC MIGRAINE WITHOUT AURA WITHOUT STATUS MIGRAINOSUS, NOT INTRACTABLE: ICD-10-CM

## 2023-10-11 RX ORDER — TOPIRAMATE 25 MG/1
TABLET ORAL
Qty: 270 TABLET | Refills: 3 | Status: SHIPPED | OUTPATIENT
Start: 2023-10-11

## 2024-01-22 ENCOUNTER — TELEPHONE (OUTPATIENT)
Age: 59
End: 2024-01-22

## 2024-01-22 ENCOUNTER — TELEPHONE (OUTPATIENT)
Dept: FAMILY MEDICINE CLINIC | Facility: CLINIC | Age: 59
End: 2024-01-22

## 2024-01-22 NOTE — TELEPHONE ENCOUNTER
Patient called to request appointment, patient symptoms lower right side pain , radiates front and back. Patient states she's had symptoms several days, patient is addiment about seeing Dr. Camp.Upon checking solutions schedule, contacted practice/clerical 2 times, no respons . Practice/clinical. Advised to advise Patient to either see another provider within practice or urgent care. Advised patient , patient is willing to wait for an earlier date appointment with Dr Camp. Please advise Patient at [phone].

## 2024-01-22 NOTE — TELEPHONE ENCOUNTER
I called and spoke with the patient and she stated it has been ongoing intermittently x 3-4 months and has been worsening over the past 2 weeks. She denies any trouble or issues with urination or her bowels, denies any fevers. Has not been doing anything for the pain, it is worse when she lays on her right side and first thing in the morning when she just gets up.

## 2024-01-22 NOTE — TELEPHONE ENCOUNTER
Patient called with lower right side pain , radiates front and back and only wants to see you for the pain.  May I fit her in anywhere on your schedule.     She was asked to see another physician or go to urgent care and she refuses.

## 2024-01-29 ENCOUNTER — OFFICE VISIT (OUTPATIENT)
Dept: FAMILY MEDICINE CLINIC | Facility: CLINIC | Age: 59
End: 2024-01-29
Payer: COMMERCIAL

## 2024-01-29 VITALS
TEMPERATURE: 97.5 F | BODY MASS INDEX: 39.74 KG/M2 | DIASTOLIC BLOOD PRESSURE: 80 MMHG | SYSTOLIC BLOOD PRESSURE: 140 MMHG | WEIGHT: 253.2 LBS | HEIGHT: 67 IN | HEART RATE: 94 BPM | OXYGEN SATURATION: 98 %

## 2024-01-29 DIAGNOSIS — E03.9 ACQUIRED HYPOTHYROIDISM: ICD-10-CM

## 2024-01-29 DIAGNOSIS — M54.50 CHRONIC RIGHT-SIDED LOW BACK PAIN WITHOUT SCIATICA: Primary | ICD-10-CM

## 2024-01-29 DIAGNOSIS — E78.01 FAMILIAL HYPERCHOLESTEROLEMIA: ICD-10-CM

## 2024-01-29 DIAGNOSIS — G89.29 CHRONIC RIGHT-SIDED LOW BACK PAIN WITHOUT SCIATICA: Primary | ICD-10-CM

## 2024-01-29 DIAGNOSIS — Z12.31 ENCOUNTER FOR SCREENING MAMMOGRAM FOR MALIGNANT NEOPLASM OF BREAST: ICD-10-CM

## 2024-01-29 PROCEDURE — 99214 OFFICE O/P EST MOD 30 MIN: CPT | Performed by: FAMILY MEDICINE

## 2024-01-29 NOTE — PROGRESS NOTES
"Name: Fartun John      : 1965      MRN: [id number]  Encounter Provider: Mita Camp DO  Encounter Date: 2024   Encounter department: [facility] PRIMARY CARE    Assessment & Plan     1. Chronic right-sided low back pain without sciatica  -     XR spine lumbar minimum 4 views non injury; Future; Expected date: 2024    2. Encounter for screening mammogram for malignant neoplasm of breast  -     Mammo screening bilateral w 3d & cad; Future; Expected date: 2024    3. Acquired hypothyroidism  -     TSH, 3rd generation; Future    4. Familial hypercholesterolemia  -     Lipid Panel with Direct LDL reflex; Future  -     Comprehensive metabolic panel; Future         Baseline lumbar x-ray s.  Continue home exercise therapy for conditioning of cervical thoracic and lumbar spine.  Possible formal PT.  Currently no red flags on symptoms or exam to warrant MRI at this time.  Patient needs to update routine preventative care and schedule annual physical.  58-year-old female with symptoms as noted in HPI.    Back Pain  This is a new problem. Episode onset: 4 months, started in front RUQ, mid RIGHT, then started radiated in the back. The problem has been waxing and waning since onset. The pain is present in the lumbar spine. The symptoms are aggravated by bending (moving). Pertinent negatives include no bladder incontinence, bowel incontinence, paresthesias or perianal numbness. (Periodic dumping once every two months) Risk factors include obesity (Does have a history of \"throwing her back out\"-last time a year or so ago.). She has tried analgesics and ice (Massage, topical) for the symptoms.     Chief Complaint   Patient presents with    Back Pain     Rightside back pain, constant pain, started in front and seemed to move to the back       Review of Systems   Gastrointestinal:  Negative for bowel incontinence.        GB is out   Genitourinary:  Negative for bladder incontinence.        " Postmenopausal 2018   Musculoskeletal:  Positive for back pain.   Neurological:  Negative for paresthesias.       Past Medical History:   Diagnosis Date    Abnormal Pap smear of cervix     Blood pressure elevated without history of HTN 10/16/2012    Resolved:  September 30, 2016    Encounter for surveillance of contraceptives 12/03/2013    Resolved:  November 14, 2017    Gall bladder polyp 09/27/2012    Resolved:  November 14, 2017    Hemorrhoids 11/14/2017    Impaired fasting glucose 10/18/2012    Resolved:  November 14, 2017    Locking of left knee 08/01/2014    Resolved:  November 14, 2017    Migraine 09/27/2012    Resolved:  November 14, 2017    Oligomenorrhea 05/02/2013    Resolved:  November 14, 2017    Osteoarthritis     Vitamin B12 deficiency 10/08/2012    Resolved:  November 14, 2017    Vitamin D deficiency 10/18/2012    Resolved:  November 14, 2017     Past Surgical History:   Procedure Laterality Date    CHOLECYSTECTOMY  01/07/2009    EXPLORATORY LAPAROTOMY  06/24/2014    EYE SURGERY  06/24/2014    GALLBLADDER SURGERY      MOUTH SURGERY      Tooth Extraction    SC COLONOSCOPY FLX DX W/COLLJ SPEC WHEN PFRMD N/A 4/30/2019    Procedure: COLONOSCOPY;  Surgeon: Griffin Carreon MD;  Location: [facility] GI LAB;  Service: Gastroenterology     Family History   Problem Relation Age of Onset    Lupus Mother         Systemic Lupus Erythematosus    Hyperlipidemia Mother     Migraines Mother     Stroke Mother     Lymphoma Father         B-cell Lymphoma    Hyperlipidemia Father     Stroke Father     Prostate cancer Father     No Known Problems Maternal Grandmother     Leukemia Maternal Grandfather     Acute lymphoblastic leukemia Maternal Grandfather     Diabetes Paternal Grandmother     Asthma Paternal Grandmother     No Known Problems Paternal Grandfather     Breast cancer Other     No Known Problems Half-Sister     No Known Problems Half-Sister      Social History     Socioeconomic History    Marital status: Significant  Other     Spouse name: None    Number of children: None    Years of education: None    Highest education level: None   Occupational History    None   Tobacco Use    Smoking status: Former     Current packs/day: 0.00     Types: Cigarettes     Quit date: 2019     Years since quittin.1    Smokeless tobacco: Never   Vaping Use    Vaping status: Never Used   Substance and Sexual Activity    Alcohol use: No    Drug use: No    Sexual activity: Not Currently     Partners: Male     Birth control/protection: Condom Male   Other Topics Concern    None   Social History Narrative    Caffeine use    Denied:   - no children    Gnosticist Affiliation W[employer]ca    Uses Safety Equipment - Seatbelts     Social Determinants of Health     Financial Resource Strain: Not on file   Food Insecurity: Not on file   Transportation Needs: Not on file   Physical Activity: Not on file   Stress: Not on file   Social Connections: Not on file   Intimate Partner Violence: Not on file   Housing Stability: Not on file     Current Outpatient Medications on File Prior to Visit   Medication Sig    cholestyramine (QUESTRAN) 4 g packet Take 1 packet (4 g total) by mouth in the morning    diclofenac sodium (VOLTAREN) 1 % Apply 2 g topically 4 (four) times a day (Patient taking differently: Apply 2 g topically as needed)    pancrelipase, Lip-Prot-Amyl, (CREON) 6,000 units delayed release capsule One daily    rizatriptan (MAXALT) 10 MG tablet Take 10 mg by mouth once as needed for migraine May repeat in 2 hours if needed    topiramate (TOPAMAX) 25 mg tablet TAKE 3 TABLETS BY MOUTH DAILY     Allergies   Allergen Reactions    Darvon [Propoxyphene]     Other      Pt has an allergy to pickles     Immunization History   Administered Date(s) Administered    COVID-19 PFIZER VACCINE 0.3 ML IM 2021, 2021, 10/23/2021    COVID-19 Pfizer Vac BIVALENT Kennedy-sucrose 12 Yr+ IM 10/15/2022    COVID-19 Pfizer vac (Kennedy-sucrose, gray cap) 12 yr+ IM  "05/12/2022    INFLUENZA 10/01/2021, 10/01/2022       Objective     /80   Pulse 94   Temp 97.5 °F (36.4 °C) (Temporal)   Ht 5' 6.75\" (1.695 m)   Wt 115 kg (253 lb 3.2 oz)   LMP 08/15/2018 (Approximate)   SpO2 98%   BMI 39.95 kg/m²     Physical Exam  Constitutional:       Appearance: Normal appearance.      Comments: 58-year-old female appears her stated age with BMI of 39%   Cardiovascular:      Rate and Rhythm: Normal rate and regular rhythm.   Pulmonary:      Effort: Pulmonary effort is normal.      Breath sounds: Normal breath sounds.   Abdominal:      General: Bowel sounds are normal.      Palpations: Abdomen is soft. There is no mass.      Tenderness: There is no abdominal tenderness.   Musculoskeletal:      Lumbar back: Spasms (Noted in the L1-L5 paraspinal muscle) present. Decreased range of motion (Extension and right rotation). Negative right straight leg raise test and negative left straight leg raise test.   Skin:     Findings: No rash.   Neurological:      Mental Status: She is alert and oriented to person, place, and time.      Sensory: No sensory deficit.      Coordination: Coordination normal.      Deep Tendon Reflexes: Reflexes normal.   Psychiatric:         Mood and Affect: Mood normal.         Behavior: Behavior normal.         Thought Content: Thought content normal.         Judgment: Judgment normal.       Mita Camp, DO    "

## 2024-02-02 ENCOUNTER — APPOINTMENT (OUTPATIENT)
Dept: RADIOLOGY | Age: 59
End: 2024-02-02
Payer: COMMERCIAL

## 2024-02-02 DIAGNOSIS — M54.50 CHRONIC RIGHT-SIDED LOW BACK PAIN WITHOUT SCIATICA: ICD-10-CM

## 2024-02-02 DIAGNOSIS — G89.29 CHRONIC RIGHT-SIDED LOW BACK PAIN WITHOUT SCIATICA: ICD-10-CM

## 2024-02-02 LAB
ALBUMIN SERPL-MCNC: 4 G/DL (ref 3.6–5.1)
ALBUMIN/GLOB SERPL: 1.6 (CALC) (ref 1–2.5)
ALP SERPL-CCNC: 132 U/L (ref 37–153)
ALT SERPL-CCNC: 15 U/L (ref 6–29)
AST SERPL-CCNC: 12 U/L (ref 10–35)
BILIRUB SERPL-MCNC: 0.6 MG/DL (ref 0.2–1.2)
BUN SERPL-MCNC: 16 MG/DL (ref 7–25)
BUN/CREAT SERPL: 15 (CALC) (ref 6–22)
CALCIUM SERPL-MCNC: 9.9 MG/DL (ref 8.6–10.4)
CHLORIDE SERPL-SCNC: 107 MMOL/L (ref 98–110)
CHOLEST SERPL-MCNC: 219 MG/DL
CHOLEST/HDLC SERPL: 3.8 (CALC)
CO2 SERPL-SCNC: 26 MMOL/L (ref 20–32)
CREAT SERPL-MCNC: 1.04 MG/DL (ref 0.5–1.03)
GFR/BSA.PRED SERPLBLD CYS-BASED-ARV: 63 ML/MIN/1.73M2
GLOBULIN SER CALC-MCNC: 2.5 G/DL (CALC) (ref 1.9–3.7)
GLUCOSE SERPL-MCNC: 101 MG/DL (ref 65–99)
HDLC SERPL-MCNC: 58 MG/DL
LDLC SERPL CALC-MCNC: 143 MG/DL (CALC)
NONHDLC SERPL-MCNC: 161 MG/DL (CALC)
POTASSIUM SERPL-SCNC: 4.5 MMOL/L (ref 3.5–5.3)
PROT SERPL-MCNC: 6.5 G/DL (ref 6.1–8.1)
SODIUM SERPL-SCNC: 139 MMOL/L (ref 135–146)
TRIGL SERPL-MCNC: 81 MG/DL
TSH SERPL-ACNC: 1.66 MIU/L (ref 0.4–4.5)

## 2024-02-02 PROCEDURE — 72110 X-RAY EXAM L-2 SPINE 4/>VWS: CPT

## 2024-02-21 PROBLEM — Z12.11 SCREENING FOR COLON CANCER: Status: RESOLVED | Noted: 2019-02-22 | Resolved: 2024-02-21

## 2024-02-21 PROBLEM — Z12.11 SCREENING FOR MALIGNANT NEOPLASM OF COLON: Status: RESOLVED | Noted: 2019-02-22 | Resolved: 2024-02-21

## 2024-03-07 ENCOUNTER — HOSPITAL ENCOUNTER (OUTPATIENT)
Dept: MAMMOGRAPHY | Facility: MEDICAL CENTER | Age: 59
Discharge: HOME | End: 2024-03-07
Payer: COMMERCIAL

## 2024-03-07 VITALS — BODY MASS INDEX: 39.79 KG/M2 | HEIGHT: 67 IN | WEIGHT: 253.53 LBS

## 2024-03-07 DIAGNOSIS — Z12.31 ENCOUNTER FOR SCREENING MAMMOGRAM FOR MALIGNANT NEOPLASM OF BREAST: ICD-10-CM

## 2024-03-07 PROCEDURE — 77063 BREAST TOMOSYNTHESIS BI: CPT

## 2024-03-07 PROCEDURE — 77067 SCR MAMMO BI INCL CAD: CPT

## 2024-09-03 DIAGNOSIS — G43.709 CHRONIC MIGRAINE WITHOUT AURA WITHOUT STATUS MIGRAINOSUS, NOT INTRACTABLE: ICD-10-CM

## 2024-09-05 RX ORDER — TOPIRAMATE 25 MG/1
TABLET, FILM COATED ORAL
Qty: 270 TABLET | Refills: 0 | Status: SHIPPED | OUTPATIENT
Start: 2024-09-05

## 2024-09-27 DIAGNOSIS — Z00.6 ENCOUNTER FOR EXAMINATION FOR NORMAL COMPARISON OR CONTROL IN CLINICAL RESEARCH PROGRAM: ICD-10-CM

## 2024-11-25 DIAGNOSIS — G43.709 CHRONIC MIGRAINE WITHOUT AURA WITHOUT STATUS MIGRAINOSUS, NOT INTRACTABLE: ICD-10-CM

## 2024-11-27 RX ORDER — TOPIRAMATE 25 MG/1
75 TABLET, FILM COATED ORAL DAILY
Qty: 270 TABLET | Refills: 0 | Status: SHIPPED | OUTPATIENT
Start: 2024-11-27

## 2025-02-03 DIAGNOSIS — G43.709 CHRONIC MIGRAINE WITHOUT AURA WITHOUT STATUS MIGRAINOSUS, NOT INTRACTABLE: ICD-10-CM

## 2025-02-05 NOTE — TELEPHONE ENCOUNTER
Requested medication(s) are due for refill today: No  Patient has already received a courtesy refill: No  Other reason request has been forwarded to provider: patient does not need this refilled at this time and she is schedule for her annual in April

## 2025-02-06 RX ORDER — TOPIRAMATE 25 MG/1
75 TABLET, FILM COATED ORAL DAILY
Qty: 270 TABLET | Refills: 3 | Status: SHIPPED | OUTPATIENT
Start: 2025-02-06

## 2025-02-06 NOTE — TELEPHONE ENCOUNTER
Patient called stating she wants this prescription cancelled because she has enough medication to last her until April.     Additionally, patient insurance and mail order pharmacy has changed. Insurance and pharmacy are now updated in system (new insurance is [employer] and new mail order is [facility]).     PLEASE CANCEL THE PRESCRIPTION FOR TOPIRAMATE.

## 2025-04-04 ENCOUNTER — OFFICE VISIT (OUTPATIENT)
Dept: FAMILY MEDICINE CLINIC | Facility: CLINIC | Age: 60
End: 2025-04-04
Payer: COMMERCIAL

## 2025-04-04 VITALS
SYSTOLIC BLOOD PRESSURE: 140 MMHG | WEIGHT: 246.2 LBS | OXYGEN SATURATION: 99 % | TEMPERATURE: 97.2 F | BODY MASS INDEX: 38.64 KG/M2 | HEIGHT: 67 IN | HEART RATE: 97 BPM | DIASTOLIC BLOOD PRESSURE: 88 MMHG

## 2025-04-04 DIAGNOSIS — Z23 ENCOUNTER FOR IMMUNIZATION: ICD-10-CM

## 2025-04-04 DIAGNOSIS — Z00.00 ANNUAL PHYSICAL EXAM: Primary | ICD-10-CM

## 2025-04-04 DIAGNOSIS — Z13.89 SCREENING FOR RHEUMATIC DISORDER: ICD-10-CM

## 2025-04-04 DIAGNOSIS — G43.709 CHRONIC MIGRAINE WITHOUT AURA WITHOUT STATUS MIGRAINOSUS, NOT INTRACTABLE: ICD-10-CM

## 2025-04-04 DIAGNOSIS — Z12.31 ENCOUNTER FOR SCREENING MAMMOGRAM FOR BREAST CANCER: ICD-10-CM

## 2025-04-04 DIAGNOSIS — Z13.228 SCREENING FOR METABOLIC DISORDER: ICD-10-CM

## 2025-04-04 PROCEDURE — 99396 PREV VISIT EST AGE 40-64: CPT | Performed by: FAMILY MEDICINE

## 2025-04-04 PROCEDURE — 90471 IMMUNIZATION ADMIN: CPT

## 2025-04-04 PROCEDURE — 90715 TDAP VACCINE 7 YRS/> IM: CPT

## 2025-04-04 RX ORDER — TOPIRAMATE 25 MG/1
75 TABLET, FILM COATED ORAL DAILY
Qty: 270 TABLET | Refills: 3 | Status: SHIPPED | OUTPATIENT
Start: 2025-04-04

## 2025-04-04 NOTE — PROGRESS NOTES
Annual Physical  Name: Fartun John      : 1965      MRN: [id number]  Encounter Provider: Mita Camp DO  Encounter Date: 2025   Encounter department: [facility] PRIMARY CARE    :  Assessment & Plan  Annual physical exam  Return in about 1 year (around 2026) for Annual physical.  Patient will need to consider new PCP as I am retiring in      Encounter for screening mammogram for breast cancer    Orders:  •  Mammo screening bilateral w 3d and cad; Future    Chronic migraine without aura without status migrainosus, not intractable    Orders:  •  topiramate (TOPAMAX) 25 mg tablet; Take 3 tablets (75 mg total) by mouth in the morning    Screening for metabolic disorder    Orders:  •  Comprehensive metabolic panel; Future    Screening for rheumatic disorder    Orders:  •  FEI Screen w/Reflex Cascade; Future    Encounter for immunization    Orders:  •  TDAP VACCINE GREATER THAN OR EQUAL TO 8YO IM        Preventive Screenings:  - Diabetes Screening: orders placed  - Cholesterol Screening: screening not indicated, has hyperlipidemia and orders placed   - Hepatitis C screening: screening up-to-date   - HIV screening: screening not indicated   - Cervical cancer screening: risks/benefits discussed   - Breast cancer screening: screening up-to-date   - Colon cancer screening: screening up-to-date   - Lung cancer screening: screening not indicated     Immunizations:  - Immunizations due: Zoster (Shingrix)  - Risks/benefits immunizations discussed    - The patient declines recommended vaccines currently despite my recommendations      Counseling/Anticipatory Guidance:    - Dental health: discussed importance of regular tooth brushing, flossing, and dental visits.   - Diet: discussed recommendations for a healthy/well-balanced diet.   - Injury prevention: discussed safety/seat belts, safety helmets, smoke detectors, carbon monoxide detectors, and smoking near bedding or upholstery.   Up to  "date on COVID  Encouraged Derm skin cancer screening    History of Present Illness   Chief Complaint   Patient presents with   • Annual Exam      Adult Annual Physical:  Patient presents for annual physical.     Diet and Physical Activity:  - Diet/Nutrition: well balanced diet. follows weight watchers  - Exercise: 3-4 times a week on average and walking. shiela    Depression Screening:  - PHQ-2 Score: 0    General Health:  - Sleep: 7-8 hours of sleep on average and sleeps well.  - Hearing: decreased hearing bilateral ears.  - Vision: wears glasses and most recent eye exam > 1 year ago.  - Dental: no dental visits for > 1 year and brushes teeth twice daily.    /GYN Health:  - Follows with GYN: no.   - Menopause: postmenopausal.   - History of STDs: yes  - Contraception: tubal ligation. HPV      Advanced Care Planning:  - Has an advanced directive?: yes    - Has a durable medical POA?: no      OB History        0    Para   0    Term   0       0    AB   0    Living   0       SAB   0    IAB   0    Ectopic   0    Multiple   0    Live Births   0                Review of Systems   Constitutional:  Negative for fatigue.   Gastrointestinal:         Tied to my diet   Genitourinary:         Slight urgency   Musculoskeletal:  Positive for back pain.        Decrease flex   All other systems reviewed and are negative.        Objective   /88   Pulse 97   Temp (!) 97.2 °F (36.2 °C) (Temporal)   Ht 5' 6.75\" (1.695 m)   Wt 112 kg (246 lb 3.2 oz)   LMP 08/15/2018 (Approximate)   SpO2 99%   BMI 38.85 kg/m²     Physical Exam  Vitals and nursing note reviewed.   Constitutional:       General: She is not in acute distress.     Appearance: Normal appearance. She is well-developed.      Comments: 59 year old female   HENT:      Head: Normocephalic and atraumatic.      Right Ear: Tympanic membrane normal.      Left Ear: Tympanic membrane normal.      Nose: Nose normal.      Mouth/Throat:      Mouth: Mucous " membranes are moist.     Eyes:      Conjunctiva/sclera: Conjunctivae normal.     Neck:      Vascular: No carotid bruit.     Cardiovascular:      Rate and Rhythm: Normal rate and regular rhythm.      Heart sounds: No murmur heard.  Pulmonary:      Effort: Pulmonary effort is normal. No respiratory distress.      Breath sounds: Normal breath sounds.   Abdominal:      General: Bowel sounds are normal.      Palpations: Abdomen is soft.      Tenderness: There is no abdominal tenderness.     Musculoskeletal:         General: No swelling.      Lumbar back: Spasms present. Decreased range of motion (left hamstring).      Comments: Gait is steady     Skin:     Findings: No rash.     Neurological:      Mental Status: She is alert and oriented to person, place, and time.     Psychiatric:         Mood and Affect: Mood normal.         Behavior: Behavior normal.         Thought Content: Thought content normal.         Judgment: Judgment normal.

## 2025-04-04 NOTE — PATIENT INSTRUCTIONS
"Patient Education     Routine physical for adults   The Basics   Written by the doctors and editors at [facility]   What is a physical? -- A physical is a routine visit, or \"check-up,\" with your doctor. You might also hear it called a \"wellness visit\" or \"preventive visit.\"  During each visit, the doctor will:   Ask about your physical and mental health   Ask about your habits, behaviors, and lifestyle   Do an exam   Give you vaccines if needed   Talk to you about any medicines you take   Give advice about your health   Answer your questions  Getting regular check-ups is an important part of taking care of your health. It can help your doctor find and treat any problems you have. But it's also important for preventing health problems.  A routine physical is different from a \"sick visit.\" A sick visit is when you see a doctor because of a health concern or problem. Since physicals are scheduled ahead of time, you can think about what you want to ask the doctor.  How often should I get a physical? -- It depends on your age and health. In general, for people age 21 years and older:   If you are younger than 50 years, you might be able to get a physical every 3 years.   If you are 50 years or older, your doctor might recommend a physical every year.  If you have an ongoing health condition, like diabetes or high blood pressure, your doctor will probably want to see you more often.  What happens during a physical? -- In general, each visit will include:   Physical exam - The doctor or nurse will check your height, weight, heart rate, and blood pressure. They will also look at your eyes and ears. They will ask about how you are feeling and whether you have any symptoms that bother you.   Medicines - It's a good idea to bring a list of all the medicines you take to each doctor visit. Your doctor will talk to you about your medicines and answer any questions. Tell them if you are having any side effects that bother you. You " "should also tell them if you are having trouble paying for any of your medicines.   Habits and behaviors - This includes:   Your diet   Your exercise habits   Whether you smoke, drink alcohol, or use drugs   Whether you are sexually active   Whether you feel safe at home  Your doctor will talk to you about things you can do to improve your health and lower your risk of health problems. They will also offer help and support. For example, if you want to quit smoking, they can give you advice and might prescribe medicines. If you want to improve your diet or get more physical activity, they can help you with this, too.   Lab tests, if needed - The tests you get will depend on your age and situation. For example, your doctor might want to check your:   Cholesterol   Blood sugar   Iron level   Vaccines - The recommended vaccines will depend on your age, health, and what vaccines you already had. Vaccines are very important because they can prevent certain serious or deadly infections.   Discussion of screening - \"Screening\" means checking for diseases or other health problems before they cause symptoms. Your doctor can recommend screening based on your age, risk, and preferences. This might include tests to check for:   Cancer, such as breast, prostate, cervical, ovarian, colorectal, prostate, lung, or skin cancer   Sexually transmitted infections, such as chlamydia and gonorrhea   Mental health conditions like depression and anxiety  Your doctor will talk to you about the different types of screening tests. They can help you decide which screenings to have. They can also explain what the results might mean.   Answering questions - The physical is a good time to ask the doctor or nurse questions about your health. If needed, they can refer you to other doctors or specialists, too.  Adults older than 65 years often need other care, too. As you get older, your doctor will talk to you about:   How to prevent falling at " home   Hearing or vision tests   Memory testing   How to take your medicines safely   Making sure that you have the help and support you need at home  All topics are updated as new evidence becomes available and our peer review process is complete.  This topic retrieved from [employer] on: May 02, 2024.  Topic 712036 Version 1.0  Release: 32.4.3 - C32.122  © 2024 UpToDate, Inc. and/or its affiliates. All rights reserved.  Consumer Information Use and Disclaimer   Disclaimer: This generalized information is a limited summary of diagnosis, treatment, and/or medication information. It is not meant to be comprehensive and should be used as a tool to help the user understand and/or assess potential diagnostic and treatment options. It does NOT include all information about conditions, treatments, medications, side effects, or risks that may apply to a specific patient. It is not intended to be medical advice or a substitute for the medical advice, diagnosis, or treatment of a health care provider based on the health care provider's examination and assessment of a patient's specific and unique circumstances. Patients must speak with a health care provider for complete information about their health, medical questions, and treatment options, including any risks or benefits regarding use of medications. This information does not endorse any treatments or medications as safe, effective, or approved for treating a specific patient. UpToDate, Inc. and its affiliates disclaim any warranty or liability relating to this information or the use thereof.The use of this information is governed by the Terms of Use, available at https://www.woltersAxtriauwer.com/en/know/clinical-effectiveness-terms. 2024© UpToDate, Inc. and its affiliates and/or licensors. All rights reserved.  Copyright   © 2024 UpToDate, Inc. and/or its affiliates. All rights reserved.

## 2025-06-06 ENCOUNTER — HOSPITAL ENCOUNTER (OUTPATIENT)
Dept: MAMMOGRAPHY | Facility: MEDICAL CENTER | Age: 60
Discharge: HOME | End: 2025-06-06
Payer: COMMERCIAL

## 2025-06-06 VITALS — HEIGHT: 67 IN | WEIGHT: 246 LBS | BODY MASS INDEX: 38.61 KG/M2

## 2025-06-06 DIAGNOSIS — Z12.31 ENCOUNTER FOR SCREENING MAMMOGRAM FOR BREAST CANCER: ICD-10-CM

## 2025-06-06 PROCEDURE — 77063 BREAST TOMOSYNTHESIS BI: CPT

## 2025-06-06 PROCEDURE — 77067 SCR MAMMO BI INCL CAD: CPT

## 2025-08-05 ENCOUNTER — HOSPITAL ENCOUNTER (OUTPATIENT)
Dept: MAMMOGRAPHY | Facility: CLINIC | Age: 60
Discharge: HOME | End: 2025-08-05
Attending: FAMILY MEDICINE
Payer: COMMERCIAL

## 2025-08-05 ENCOUNTER — RESULTS FOLLOW-UP (OUTPATIENT)
Dept: FAMILY MEDICINE CLINIC | Facility: CLINIC | Age: 60
End: 2025-08-05

## 2025-08-05 ENCOUNTER — HOSPITAL ENCOUNTER (OUTPATIENT)
Dept: ULTRASOUND IMAGING | Facility: CLINIC | Age: 60
Discharge: HOME | End: 2025-08-05
Attending: FAMILY MEDICINE
Payer: COMMERCIAL

## 2025-08-05 ENCOUNTER — TELEPHONE (OUTPATIENT)
Dept: FAMILY MEDICINE CLINIC | Facility: CLINIC | Age: 60
End: 2025-08-05

## 2025-08-05 VITALS — BODY MASS INDEX: 38.61 KG/M2 | HEIGHT: 67 IN | WEIGHT: 246 LBS

## 2025-08-05 DIAGNOSIS — R92.8 ABNORMAL SCREENING MAMMOGRAM: ICD-10-CM

## 2025-08-05 PROCEDURE — G0279 TOMOSYNTHESIS, MAMMO: HCPCS

## 2025-08-05 PROCEDURE — 77065 DX MAMMO INCL CAD UNI: CPT

## 2025-08-05 PROCEDURE — 76642 ULTRASOUND BREAST LIMITED: CPT

## 2025-08-07 DIAGNOSIS — N60.01 CYST OF RIGHT BREAST: Primary | ICD-10-CM

## (undated) DEVICE — ENDOCUFF VISION MED BLUE ID 11.0